# Patient Record
Sex: FEMALE | Race: WHITE | NOT HISPANIC OR LATINO | Employment: FULL TIME | URBAN - METROPOLITAN AREA
[De-identification: names, ages, dates, MRNs, and addresses within clinical notes are randomized per-mention and may not be internally consistent; named-entity substitution may affect disease eponyms.]

---

## 2017-01-19 ENCOUNTER — HOSPITAL ENCOUNTER (OUTPATIENT)
Dept: CARDIOLOGY | Facility: MEDICAL CENTER | Age: 57
End: 2017-01-19
Attending: INTERNAL MEDICINE
Payer: COMMERCIAL

## 2017-01-19 ENCOUNTER — TELEPHONE (OUTPATIENT)
Dept: CARDIOLOGY | Facility: MEDICAL CENTER | Age: 57
End: 2017-01-19

## 2017-01-19 DIAGNOSIS — R07.9 CHEST PAIN, UNSPECIFIED TYPE: ICD-10-CM

## 2017-01-19 LAB
LV EJECT FRACT  99904: 65
LV EJECT FRACT MOD 2C 99903: 64.28
LV EJECT FRACT MOD 4C 99902: 66.86
LV EJECT FRACT MOD BP 99901: 67.36

## 2017-01-19 PROCEDURE — 93306 TTE W/DOPPLER COMPLETE: CPT

## 2017-01-19 PROCEDURE — 93306 TTE W/DOPPLER COMPLETE: CPT | Performed by: INTERNAL MEDICINE

## 2017-01-19 NOTE — TELEPHONE ENCOUNTER
----- Message from Melinda Duarte M.D. sent at 1/19/2017 12:41 PM PST -----  Normal and reassuring echo

## 2017-01-19 NOTE — TELEPHONE ENCOUNTER
Called patient and advised her of normal echocardiogram results. Patient is thankful for the call.    JD CASTELLANOS

## 2017-01-26 ENCOUNTER — TELEPHONE (OUTPATIENT)
Dept: CARDIOLOGY | Facility: MEDICAL CENTER | Age: 57
End: 2017-01-26

## 2017-01-26 ENCOUNTER — HOSPITAL ENCOUNTER (OUTPATIENT)
Dept: RADIOLOGY | Facility: MEDICAL CENTER | Age: 57
End: 2017-01-26
Attending: INTERNAL MEDICINE
Payer: COMMERCIAL

## 2017-01-26 DIAGNOSIS — R07.89 OTHER CHEST PAIN: ICD-10-CM

## 2017-01-26 DIAGNOSIS — R07.2 PRECORDIAL PAIN: ICD-10-CM

## 2017-01-26 PROCEDURE — 4410556 CT-CARDIAC SCORING

## 2017-01-26 NOTE — TELEPHONE ENCOUNTER
Called patient and advised her of CT-Cardiac Scoring result and Dr. Duarte's note. Patient is thankful for the good news.    JD CASTELLANOS

## 2017-04-10 ENCOUNTER — OFFICE VISIT (OUTPATIENT)
Dept: CARDIOLOGY | Facility: MEDICAL CENTER | Age: 57
End: 2017-04-10
Payer: COMMERCIAL

## 2017-04-10 ENCOUNTER — TELEPHONE (OUTPATIENT)
Dept: CARDIOLOGY | Facility: MEDICAL CENTER | Age: 57
End: 2017-04-10

## 2017-04-10 VITALS
DIASTOLIC BLOOD PRESSURE: 84 MMHG | OXYGEN SATURATION: 95 % | HEART RATE: 78 BPM | HEIGHT: 67 IN | BODY MASS INDEX: 24.33 KG/M2 | WEIGHT: 155 LBS | SYSTOLIC BLOOD PRESSURE: 136 MMHG

## 2017-04-10 DIAGNOSIS — I20.1 VASOSPASTIC ANGINA (HCC): ICD-10-CM

## 2017-04-10 DIAGNOSIS — R07.2 PRECORDIAL PAIN: ICD-10-CM

## 2017-04-10 PROCEDURE — 99214 OFFICE O/P EST MOD 30 MIN: CPT | Performed by: INTERNAL MEDICINE

## 2017-04-10 ASSESSMENT — ENCOUNTER SYMPTOMS
SHORTNESS OF BREATH: 0
LOSS OF CONSCIOUSNESS: 0
EYES NEGATIVE: 1
ABDOMINAL PAIN: 0
WEIGHT LOSS: 0
HEARTBURN: 0
PALPITATIONS: 0
DIZZINESS: 0
MUSCULOSKELETAL NEGATIVE: 1
PND: 0
INSOMNIA: 0
COUGH: 0
HEADACHES: 0
NAUSEA: 0
NERVOUS/ANXIOUS: 0

## 2017-04-10 NOTE — Clinical Note
[facility]   [address]  [place] [address]  Phone: [phone]  Fax: [phone]              Alejandra Berkowitz  1960    Encounter Date: 4/10/2017    Melinda Duarte M.D.          PROGRESS NOTE:  Subjective:   Alejandra Berkowitz is a 57 y.o. female who presents today in follow-up in regards to her chest pain and concerns about blood pressure       Extraordinarily healthy and active, still runs for more than 10 miles very frequently. No symptoms elicited that this, but has had more stress which does cause shoulder and neck pain. Noticing her blood pressures been high-normal in the morning but also in situations  like at the dentist  She doesn't feel untoward or nervous at these times, but blood pressures as high as 150 systolic    Trying mind fullness and meditation, enjoying her life    Past Medical History   Diagnosis Date   • ASTHMA    • H/O Prinzmetal angina 2011     last time 2011   • Dental disorder      braces     Past Surgical History   Procedure Laterality Date   • Other       varicose vein   • Other orthopedic surgery  2004     neuroma bilat feet.   • Phlebectomy  6/26/2013     Performed by Darrius Blair M.D. at SURGERY [facility]   • Breast biopsy       hx of benign right breast biopsy     Family History   Problem Relation Age of Onset   • Heart Attack Neg Hx    • Heart Disease Neg Hx    • Heart Failure Neg Hx      History   Smoking status   • Former Smoker   • Quit date: 01/01/1993   Smokeless tobacco   • Never Used     No Known Allergies  Outpatient Encounter Prescriptions as of 4/10/2017   Medication Sig Dispense Refill   • nitroglycerin (NITROSTAT) 0.4 MG SL Tab Place 1 Tab under tongue as needed for Chest Pain (1 tab under the tongue every 5 minutes for chest pain, up to 3 doses in 15 minutes). 30 Tab 11   • ALBUTEROL INH Inhale 2 Puffs by mouth as needed.     • Cholecalciferol (VITAMIN D PO) Take 1 Cap by mouth every  "day.     • Cyanocobalamin (VITAMIN B 12 PO) Take 1 Tab by mouth every day.     • Docosahexaenoic Acid (DHA OMEGA 3 PO) Take 1 Cap by mouth every day.     • promethazine-codeine (PHENERGAN-CODEINE) 6.25-10 MG/5ML Syrup Take 5-10 mL by mouth 3 times a day as needed for Cough (or use qhs). (Patient not taking: Reported on 4/10/2017) 150 mL 0   • azithromycin (ZITHROMAX) 250 MG Tab z-toni; U.D. (Patient not taking: Reported on 4/10/2017) 6 Tab 1   • hydrocodone-acetaminophen (NORCO) 5-325 MG TABS per tablet Take 1-2 Tabs by mouth every four hours as needed. (Patient not taking: Reported on 4/10/2017) 30 Tab 0   • estradiol-norethindrone (COMBIPATCH) 0.05-0.14 MG/DAY patch Apply 1 Patch to skin as directed. Use one patch two times per week.       No facility-administered encounter medications on file as of 4/10/2017.     Review of Systems   Constitutional: Negative for weight loss and malaise/fatigue.   HENT: Positive for congestion. Negative for hearing loss.         Getting over a cold   Eyes: Negative.    Respiratory: Negative for cough and shortness of breath.    Cardiovascular: Negative for palpitations, leg swelling and PND.   Gastrointestinal: Negative for heartburn, nausea and abdominal pain.   Genitourinary: Negative.    Musculoskeletal: Negative.    Skin: Negative for itching.   Neurological: Negative for dizziness, loss of consciousness and headaches.   Psychiatric/Behavioral: The patient is not nervous/anxious and does not have insomnia.    All other systems reviewed and are negative.       Objective:   /84 mmHg  Pulse 78  Ht 1.702 m (5' 7.01\")  Wt 70.308 kg (155 lb)  BMI 24.27 kg/m2  SpO2 95%    Physical Exam   Constitutional: She appears well-nourished.   Neck: No JVD present.   Cardiovascular: Normal rate and regular rhythm.  Exam reveals no friction rub.    No murmur heard.  Pulmonary/Chest: Breath sounds normal.   Musculoskeletal: She exhibits no edema.   Neurological: She is alert.   Skin: " Skin is warm and dry.   Psychiatric: She has a normal mood and affect. Her behavior is normal.       Assessment:     1. Precordial pain     2. Vasospastic angina (CMS-HCC)         Medical Decision Making:  Today's Assessment / Status / Plan:     Elevated blood pressure, I requested her recent blood work. She is checking to see if her kidneys and CBC are okay she has been a vegan for many years. B12 was also checked    Talked about goals of blood pressure, she will monitored consistently and she has a monitor now at home diet and exercise    Prior heart attack, we talked more than 10 minutes about this. She is very interested in physiology as she is a . I'm not sure I can elucidate the signs behind this will be noted oftentimes heart attacks happen in cases without coronary disease. Reviewed normal images of echo as well as CT scan    Reassurance, current medications looked at, she'll continue on the nitroglycerin as needed    RTC 6 months or as needed. Talked about an angiogram if she is symptomatic with escalating concerns and she voices understanding but will hold off for now      Anaya Estrada M.D.  [address]  [address]  [place] [address]  VIA Facsimile: [phone]

## 2017-04-10 NOTE — TELEPHONE ENCOUNTER
Lab results from 4/6/2017 (CMP, CBC, TSH, Vitamin B12, Vitamin D level) reviewed by Dr. Duarte. Per Dr. Duarte, labs look great. Lipid profile can be ordered if the patient would like.  Called patient and advised her of the above. Patient verbalized understanding, she does not want her cholesterol checked at this time. She states that last time her PCP checked her cholesterol it was low and her total cholesterol was less than 120.    JD RN

## 2017-04-10 NOTE — PROGRESS NOTES
Subjective:   Alejandra Berkowitz is a 57 y.o. female who presents today in follow-up in regards to her chest pain and concerns about blood pressure       Extraordinarily healthy and active, still runs for more than 10 miles very frequently. No symptoms elicited that this, but has had more stress which does cause shoulder and neck pain. Noticing her blood pressures been high-normal in the morning but also in situations  like at the dentist  She doesn't feel untoward or nervous at these times, but blood pressures as high as 150 systolic    Trying mind fullness and meditation, enjoying her life    Past Medical History   Diagnosis Date   • ASTHMA    • H/O Prinzmetal angina 2011     last time 2011   • Dental disorder      braces     Past Surgical History   Procedure Laterality Date   • Other       varicose vein   • Other orthopedic surgery  2004     neuroma bilat feet.   • Phlebectomy  6/26/2013     Performed by Darrius Blair M.D. at SURG[facility]   • Breast biopsy       hx of benign right breast biopsy     Family History   Problem Relation Age of Onset   • Heart Attack Neg Hx    • Heart Disease Neg Hx    • Heart Failure Neg Hx      History   Smoking status   • Former Smoker   • Quit date: 01/01/1993   Smokeless tobacco   • Never Used     No Known Allergies  Outpatient Encounter Prescriptions as of 4/10/2017   Medication Sig Dispense Refill   • nitroglycerin (NITROSTAT) 0.4 MG SL Tab Place 1 Tab under tongue as needed for Chest Pain (1 tab under the tongue every 5 minutes for chest pain, up to 3 doses in 15 minutes). 30 Tab 11   • ALBUTEROL INH Inhale 2 Puffs by mouth as needed.     • Cholecalciferol (VITAMIN D PO) Take 1 Cap by mouth every day.     • Cyanocobalamin (VITAMIN B 12 PO) Take 1 Tab by mouth every day.     • Docosahexaenoic Acid (DHA OMEGA 3 PO) Take 1 Cap by mouth every day.     • promethazine-codeine (PHENERGAN-CODEINE) 6.25-10 MG/5ML Syrup Take 5-10 mL by mouth 3 times a day as needed for  "Cough (or use qhs). (Patient not taking: Reported on 4/10/2017) 150 mL 0   • azithromycin (ZITHROMAX) 250 MG Tab z-toni; U.D. (Patient not taking: Reported on 4/10/2017) 6 Tab 1   • hydrocodone-acetaminophen (NORCO) 5-325 MG TABS per tablet Take 1-2 Tabs by mouth every four hours as needed. (Patient not taking: Reported on 4/10/2017) 30 Tab 0   • estradiol-norethindrone (COMBIPATCH) 0.05-0.14 MG/DAY patch Apply 1 Patch to skin as directed. Use one patch two times per week.       No facility-administered encounter medications on file as of 4/10/2017.     Review of Systems   Constitutional: Negative for weight loss and malaise/fatigue.   HENT: Positive for congestion. Negative for hearing loss.         Getting over a cold   Eyes: Negative.    Respiratory: Negative for cough and shortness of breath.    Cardiovascular: Negative for palpitations, leg swelling and PND.   Gastrointestinal: Negative for heartburn, nausea and abdominal pain.   Genitourinary: Negative.    Musculoskeletal: Negative.    Skin: Negative for itching.   Neurological: Negative for dizziness, loss of consciousness and headaches.   Psychiatric/Behavioral: The patient is not nervous/anxious and does not have insomnia.    All other systems reviewed and are negative.       Objective:   /84 mmHg  Pulse 78  Ht 1.702 m (5' 7.01\")  Wt 70.308 kg (155 lb)  BMI 24.27 kg/m2  SpO2 95%    Physical Exam   Constitutional: She appears well-nourished.   Neck: No JVD present.   Cardiovascular: Normal rate and regular rhythm.  Exam reveals no friction rub.    No murmur heard.  Pulmonary/Chest: Breath sounds normal.   Musculoskeletal: She exhibits no edema.   Neurological: She is alert.   Skin: Skin is warm and dry.   Psychiatric: She has a normal mood and affect. Her behavior is normal.       Assessment:     1. Precordial pain     2. Vasospastic angina (CMS-HCC)         Medical Decision Making:  Today's Assessment / Status / Plan:     Elevated blood pressure, " I requested her recent blood work. She is checking to see if her kidneys and CBC are okay she has been a vegan for many years. B12 was also checked    Talked about goals of blood pressure, she will monitored consistently and she has a monitor now at home diet and exercise    Prior heart attack, we talked more than 10 minutes about this. She is very interested in physiology as she is a . I'm not sure I can elucidate the signs behind this will be noted oftentimes heart attacks happen in cases without coronary disease. Reviewed normal images of echo as well as CT scan    Reassurance, current medications looked at, she'll continue on the nitroglycerin as needed    RTC 6 months or as needed. Talked about an angiogram if she is symptomatic with escalating concerns and she voices understanding but will hold off for now

## 2017-04-10 NOTE — MR AVS SNAPSHOT
"Jyotiricia Jazzy Berkowitz   4/10/2017 12:45 PM   Office Visit   MRN: [id number]    Department:  [facility]   Dept Phone:  [phone]    Description:  Female : 1960   Provider:  Melinda Duarte M.D.           Reason for Visit     Follow-Up           Allergies as of 4/10/2017     No Known Allergies      You were diagnosed with     Precordial pain   [786.51.ICD-9-CM]         Vital Signs     Blood Pressure Pulse Height Weight Body Mass Index Oxygen Saturation    136/84 mmHg 78 1.702 m (5' 7.01\") 70.308 kg (155 lb) 24.27 kg/m2 95%    Smoking Status                   Former Smoker           Basic Information     Date Of Birth Sex Race Ethnicity Preferred Language    1960 Female White Non- English      Problem List              ICD-10-CM Priority Class Noted - Resolved    Venous (peripheral) insufficiency I87.2   2013 - Present    Precordial pain R07.2   2016 - Present      Health Maintenance        Date Due Completion Dates    IMM DTaP/Tdap/Td Vaccine (1 - Tdap) 1981 ---    PAP SMEAR 1983 ---    COLONOSCOPY 2012 ---    MAMMOGRAM 2014, 2007            Current Immunizations     No immunizations on file.      Below and/or attached are the medications your provider expects you to take. Review all of your home medications and newly ordered medications with your provider and/or pharmacist. Follow medication instructions as directed by your provider and/or pharmacist. Please keep your medication list with you and share with your provider. Update the information when medications are discontinued, doses are changed, or new medications (including over-the-counter products) are added; and carry medication information at all times in the event of emergency situations     Allergies:  No Known Allergies          Medications  Valid as of: April 10, 2017 -  1:34 PM    Generic Name Brand Name Tablet Size Instructions for use    Albuterol   Inhale 2 Puffs by mouth " as needed.        Azithromycin (Tab) ZITHROMAX 250 MG z-toni; U.D.        Cholecalciferol   Take 1 Cap by mouth every day.        Cyanocobalamin   Take 1 Tab by mouth every day.        Docosahexaenoic Acid   Take 1 Cap by mouth every day.        Estradiol-Norethindrone Acet (PATCH BIWEEKLY) COMBIPATCH 0.05-0.14 MG/DAY Apply 1 Patch to skin as directed. Use one patch two times per week.        Hydrocodone-Acetaminophen (Tab) NORCO 5-325 MG Take 1-2 Tabs by mouth every four hours as needed.        Nitroglycerin (SL Tab) NITROSTAT 0.4 MG Place 1 Tab under tongue as needed for Chest Pain (1 tab under the tongue every 5 minutes for chest pain, up to 3 doses in 15 minutes).        Promethazine-Codeine (Syrup) PHENERGAN-CODEINE 6.25-10 MG/5ML Take 5-10 mL by mouth 3 times a day as needed for Cough (or use qhs).        .                 Medicines prescribed today were sent to:     [id number] - [place] - [address]    [facility] [place] [address]    Phone: [phone] Fax: [phone]    Open 24 Hours?: No      Medication refill instructions:       If your prescription bottle indicates you have medication refills left, it is not necessary to call your provider’s office. Please contact your pharmacy and they will refill your medication.    If your prescription bottle indicates you do not have any refills left, you may request refills at any time through one of the following ways: The online [facility] system (except Urgent Care), by calling your provider’s office, or by asking your pharmacy to contact your provider’s office with a refill request. Medication refills are processed only during regular business hours and may not be available until the next business day. Your provider may request additional information or to have a follow-up visit with you prior to refilling your medication.   *Please Note: Medication refills are assigned a new Rx number when refilled electronically. Your pharmacy  may indicate that no refills were authorized even though a new prescription for the same medication is available at the pharmacy. Please request the medicine by name with the pharmacy before contacting your provider for a refill.           [employer] Access Code: Activation code not generated  Current [employer] Status: Active

## 2017-06-02 DIAGNOSIS — R07.89 OTHER CHEST PAIN: ICD-10-CM

## 2017-06-02 RX ORDER — NITROGLYCERIN 0.4 MG/1
0.4 TABLET SUBLINGUAL PRN
Qty: 30 TAB | Refills: 11 | Status: SHIPPED | OUTPATIENT
Start: 2017-06-02 | End: 2018-08-27 | Stop reason: SDUPTHER

## 2018-08-27 DIAGNOSIS — R07.89 OTHER CHEST PAIN: ICD-10-CM

## 2018-08-27 RX ORDER — NITROGLYCERIN 0.4 MG/1
0.4 TABLET SUBLINGUAL PRN
Qty: 25 TAB | Refills: 3 | Status: SHIPPED | OUTPATIENT
Start: 2018-08-27 | End: 2019-02-28 | Stop reason: SDUPTHER

## 2019-02-28 ENCOUNTER — OFFICE VISIT (OUTPATIENT)
Dept: CARDIOLOGY | Facility: MEDICAL CENTER | Age: 59
End: 2019-02-28
Payer: COMMERCIAL

## 2019-02-28 VITALS
HEART RATE: 84 BPM | HEIGHT: 67 IN | OXYGEN SATURATION: 98 % | DIASTOLIC BLOOD PRESSURE: 80 MMHG | BODY MASS INDEX: 26.57 KG/M2 | SYSTOLIC BLOOD PRESSURE: 118 MMHG | WEIGHT: 169.31 LBS

## 2019-02-28 DIAGNOSIS — I20.1 VASOSPASTIC ANGINA (HCC): ICD-10-CM

## 2019-02-28 DIAGNOSIS — R07.89 OTHER CHEST PAIN: ICD-10-CM

## 2019-02-28 DIAGNOSIS — I87.2 VENOUS INSUFFICIENCY: ICD-10-CM

## 2019-02-28 DIAGNOSIS — I20.1 PRINZMETAL ANGINA (HCC): ICD-10-CM

## 2019-02-28 DIAGNOSIS — R00.2 PALPITATIONS: ICD-10-CM

## 2019-02-28 LAB — EKG IMPRESSION: NORMAL

## 2019-02-28 PROCEDURE — 93000 ELECTROCARDIOGRAM COMPLETE: CPT | Performed by: INTERNAL MEDICINE

## 2019-02-28 PROCEDURE — 99204 OFFICE O/P NEW MOD 45 MIN: CPT | Mod: 25 | Performed by: INTERNAL MEDICINE

## 2019-02-28 RX ORDER — AMLODIPINE BESYLATE 2.5 MG/1
2.5 TABLET ORAL DAILY
Qty: 90 TAB | Refills: 3 | Status: SHIPPED | OUTPATIENT
Start: 2019-02-28 | End: 2019-07-16 | Stop reason: SDUPTHER

## 2019-02-28 RX ORDER — NITROGLYCERIN 0.4 MG/1
0.4 TABLET SUBLINGUAL PRN
Qty: 25 TAB | Refills: 20 | Status: SHIPPED | OUTPATIENT
Start: 2019-02-28 | End: 2020-09-24 | Stop reason: SDUPTHER

## 2019-02-28 ASSESSMENT — ENCOUNTER SYMPTOMS: WHEEZING: 1

## 2019-02-28 NOTE — PATIENT INSTRUCTIONS
-Amlodipine 2.5 mg daily, if the BP is still above 140/80, increase to 5 mg daily (max dose is 10 mg).  -We have a lot of choices- call or send a MyChart for any side effects of anything we try: [phone]

## 2019-02-28 NOTE — LETTER
[facility]   [address]  [place] [address]  Phone: [phone]  Fax: [phone]              Alejandra Berkowitz  1960    Encounter Date: 2/28/2019    Sakina Lopez M.D.          PROGRESS NOTE:  Subjective:   Chief Complaint:   Chief Complaint   Patient presents with   • Hypertension       Alejandra Berkowitz is a 59 y.o. female who returns today for hypertension, intermittent chest tightness and intermittent palpitations.  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  Had a heart catheterization at that time that demonstrated tiny vessel occlusion.  Previously took calcium channel blockers.  Her EKG does show small inferior Q waves.    Her prinzmetal has been well controlled, better after menopause.  Her BP was 150/100 with her dentist. Cut out caffeine, wine, avoiding salt, stress management.    Has heart racing, lastly about 5-10 sec, occurring a few times a week, increased over the past 1-2 months.    She is a runner and exercises vigorously, is a runner and able to run more than 10 miles.  Drinks 1 glass of wine daily.  She is a .  She is Vegan.    LDL cholesterol is 65.  Blood pressure controlled today without medication.  Has as needed nitroglycerin.  Calcium score January 2017 was 0.  Prior normal echo and lower extreme Doppler study.    No family history of premature coronary artery disease.  No diabetes, no autoimmune disease, no tobacco.    DATA REVIEWED by me:  ECG 2/28/2019   Also think sinus arrhythmia, rate 64, ventricular escape, small inferior Q waves    EKG 12/8/2016  Sinus bradycardia, rate 57, normal EKG    Echo 1/19/2017  EF 65%, normal echo, no RVSP    Lower extremity venous Doppler 5/30/2013  Mild venous reflux, no DVT    Calcium score 1/26/2017:    Coronary calcification:  LMA - 0.0  LCX - 0.0  LAD -  0.0  RCA - 0.0  PDA - 0.0    Calcium score: 0    Left heart catheterization around 2005 in [facility], reportedly with very tiny occluded vessel consistent with coronary spasm.    Most recent labs:     2/18/2019 TSH 2.9, B12 605, total cholesterol 132, triglycerides 68, HDL 53, LDL 65, glucose 70, creatinine 0.86, sodium 143, potassium 4.1, LFTs normal, hemoglobin 15.4, platelets 249    Past Medical History:   Diagnosis Date   • ASTHMA    • Dental disorder     braces   • H/O Prinzmetal angina 2011    last time 2011     Past Surgical History:   Procedure Laterality Date   • PHLEBECTOMY  6/26/2013    Performed by Darrius Blair M.D. at SURGERY [facility]   • OTHER ORTHOPEDIC SURGERY  2004    neuroma bilat feet.   • BREAST BIOPSY      hx of benign right breast biopsy   • OTHER      varicose vein     Family History   Problem Relation Age of Onset   • Hypertension Mother 55        Smoker, overweight, no exercise   • No Known Problems Father    • Heart Attack Neg Hx    • Heart Disease Neg Hx    • Heart Failure Neg Hx      Social History     Social History   • Marital status: Single     Spouse name: N/A   • Number of children: N/A   • Years of education: N/A     Occupational History   • Not on file.     Social History Main Topics   • Smoking status: Former Smoker     Quit date: 1/1/1993   • Smokeless tobacco: Never Used   • Alcohol use Yes      Comment: 5 per week   • Drug use: No   • Sexual activity: Not on file     Other Topics Concern   • Not on file     Social History Narrative   • No narrative on file     No Known Allergies    Current Outpatient Prescriptions   Medication Sig Dispense Refill   • amLODIPine (NORVASC) 2.5 MG Tab Take 1 Tab by mouth every day. 90 Tab 3   • nitroglycerin (NITROSTAT) 0.4 MG SL Tab Place 1 Tab under tongue as needed for Chest Pain (1 tab under the tongue every 5 minutes for chest pain, up to 3 doses in 15 minutes). 25 Tab 20   • ALBUTEROL INH Inhale 2 Puffs by mouth as needed.     •  "Cholecalciferol (VITAMIN D PO) Take 1 Cap by mouth every day.     • Cyanocobalamin (VITAMIN B 12 PO) Take 1 Tab by mouth every day.     • Docosahexaenoic Acid (DHA OMEGA 3 PO) Take 1 Cap by mouth every day.       No current facility-administered medications for this visit.        Review of Systems   Respiratory: Positive for wheezing.    Cardiovascular: Positive for chest pain.     All others systems reviewed and negative.     Objective:     Blood pressure 118/80, pulse 84, height 1.702 m (5' 7.01\"), weight 76.8 kg (169 lb 5 oz), SpO2 98 %. Body mass index is 26.51 kg/m².    Physical Exam   General: No acute distress. Well nourished.  HEENT: EOM grossly intact, no scleral icterus, no pharyngeal erythema.   Neck:  No JVD, no bruits, trachea midline  CVS: RRR. Normal S1, S2. No M/R/G. No LE edema.  2+ radial pulses, 2+ DP pulses  Resp: CTAB. No wheezing or crackles/rhonchi. Normal respiratory effort.  Abdomen: Soft, NT, no tena hepatomegaly.  MSK/Ext: No clubbing or cyanosis.  Skin: Warm and dry, no rashes.  Neurological: CN III-XII grossly intact. No focal deficits.   Psych: A&O x 3, appropriate affect, good judgement      Assessment:     1. Other chest pain  nitroglycerin (NITROSTAT) 0.4 MG SL Tab   2. Vasospastic angina (HCC)     3. Palpitations  EKG   4. Venous insufficiency     5. Prinzmetal angina (HCC)         Medical Decision Making:  Today's Assessment / Status / Plan:       -Time to start prevention medication, will use [facility].  -Nitro refill   -She will call for any issues    Written instructions given today:  -Amlodipine 2.5 mg daily, if the BP is still above 140/80, increase to 5 mg daily (max dose is 10 mg).  -We have a lot of choices- call or send a MyChart for any side effects of anything we try: [phone]    Return in about 1 year (around 2/28/2020).    It is my pleasure to participate in the care of Ms. Berkowitz.  Please do not hesitate to contact me with questions or concerns.    Sakina Lopez MD, " [facility]  Cardiologist [facility]    Please note that this dictation was created using voice recognition software. I have made every reasonable attempt to correct obvious errors, but it is possible there are errors of grammar and possibly content that I did not discover before finalizing the note.      Anaya Estrada M.D.  [address]  [address]  [place] [address]  VIA Facsimile: [phone]

## 2019-02-28 NOTE — PROGRESS NOTES
Subjective:   Chief Complaint:   Chief Complaint   Patient presents with   • Hypertension       Alejandra Berkowitz is a 59 y.o. female who returns today for hypertension, intermittent chest tightness and intermittent palpitations.  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  Had a heart catheterization at that time that demonstrated tiny vessel occlusion.  Previously took calcium channel blockers.  Her EKG does show small inferior Q waves.    Her prinzmetal has been well controlled, better after menopause.  Her BP was 150/100 with her dentist. Cut out caffeine, wine, avoiding salt, stress management.    Has heart racing, lastly about 5-10 sec, occurring a few times a week, increased over the past 1-2 months.    She is a runner and exercises vigorously, is a runner and able to run more than 10 miles.  Drinks 1 glass of wine daily.  She is a .  She is Vegan.    LDL cholesterol is 65.  Blood pressure controlled today without medication.  Has as needed nitroglycerin.  Calcium score January 2017 was 0.  Prior normal echo and lower extreme Doppler study.    No family history of premature coronary artery disease.  No diabetes, no autoimmune disease, no tobacco.    DATA REVIEWED by me:  ECG 2/28/2019   Also think sinus arrhythmia, rate 64, ventricular escape, small inferior Q waves    EKG 12/8/2016  Sinus bradycardia, rate 57, normal EKG    Echo 1/19/2017  EF 65%, normal echo, no RVSP    Lower extremity venous Doppler 5/30/2013  Mild venous reflux, no DVT    Calcium score 1/26/2017:    Coronary calcification:  LMA - 0.0  LCX - 0.0  LAD - 0.0  RCA - 0.0  PDA - 0.0    Calcium score: 0    Left heart catheterization around 2005 in [facility], reportedly with very tiny occluded vessel consistent with coronary spasm.    Most recent labs:     2/18/2019 TSH 2.9, B12 605, total cholesterol 132, triglycerides 68,  HDL 53, LDL 65, glucose 70, creatinine 0.86, sodium 143, potassium 4.1, LFTs normal, hemoglobin 15.4, platelets 249    Past Medical History:   Diagnosis Date   • ASTHMA    • Dental disorder     braces   • H/O Prinzmetal angina 2011    last time 2011     Past Surgical History:   Procedure Laterality Date   • PHLEBECTOMY  6/26/2013    Performed by Darrius Blair M.D. at SURGERY [facility]   • OTHER ORTHOPEDIC SURGERY  2004    neuroma bilat feet.   • BREAST BIOPSY      hx of benign right breast biopsy   • OTHER      varicose vein     Family History   Problem Relation Age of Onset   • Hypertension Mother 55        Smoker, overweight, no exercise   • No Known Problems Father    • Heart Attack Neg Hx    • Heart Disease Neg Hx    • Heart Failure Neg Hx      Social History     Social History   • Marital status: Single     Spouse name: N/A   • Number of children: N/A   • Years of education: N/A     Occupational History   • Not on file.     Social History Main Topics   • Smoking status: Former Smoker     Quit date: 1/1/1993   • Smokeless tobacco: Never Used   • Alcohol use Yes      Comment: 5 per week   • Drug use: No   • Sexual activity: Not on file     Other Topics Concern   • Not on file     Social History Narrative   • No narrative on file     No Known Allergies    Current Outpatient Prescriptions   Medication Sig Dispense Refill   • amLODIPine (NORVASC) 2.5 MG Tab Take 1 Tab by mouth every day. 90 Tab 3   • nitroglycerin (NITROSTAT) 0.4 MG SL Tab Place 1 Tab under tongue as needed for Chest Pain (1 tab under the tongue every 5 minutes for chest pain, up to 3 doses in 15 minutes). 25 Tab 20   • ALBUTEROL INH Inhale 2 Puffs by mouth as needed.     • Cholecalciferol (VITAMIN D PO) Take 1 Cap by mouth every day.     • Cyanocobalamin (VITAMIN B 12 PO) Take 1 Tab by mouth every day.     • Docosahexaenoic Acid (DHA OMEGA 3 PO) Take 1 Cap by mouth every day.       No current facility-administered medications for this visit.  "       Review of Systems   Respiratory: Positive for wheezing.    Cardiovascular: Positive for chest pain.     All others systems reviewed and negative.     Objective:     Blood pressure 118/80, pulse 84, height 1.702 m (5' 7.01\"), weight 76.8 kg (169 lb 5 oz), SpO2 98 %. Body mass index is 26.51 kg/m².    Physical Exam   General: No acute distress. Well nourished.  HEENT: EOM grossly intact, no scleral icterus, no pharyngeal erythema.   Neck:  No JVD, no bruits, trachea midline  CVS: RRR. Normal S1, S2. No M/R/G. No LE edema.  2+ radial pulses, 2+ DP pulses  Resp: CTAB. No wheezing or crackles/rhonchi. Normal respiratory effort.  Abdomen: Soft, NT, no tena hepatomegaly.  MSK/Ext: No clubbing or cyanosis.  Skin: Warm and dry, no rashes.  Neurological: CN III-XII grossly intact. No focal deficits.   Psych: A&O x 3, appropriate affect, good judgement      Assessment:     1. Other chest pain  nitroglycerin (NITROSTAT) 0.4 MG SL Tab   2. Vasospastic angina (HCC)     3. Palpitations  EKG   4. Venous insufficiency     5. Prinzmetal angina (HCC)         Medical Decision Making:  Today's Assessment / Status / Plan:       -Time to start prevention medication, will use [facility].  -Nitro refill   -She will call for any issues    Written instructions given today:  -Amlodipine 2.5 mg daily, if the BP is still above 140/80, increase to 5 mg daily (max dose is 10 mg).  -We have a lot of choices- call or send a MyChart for any side effects of anything we try: [phone]    Return in about 1 year (around 2/28/2020).    It is my pleasure to participate in the care of Ms. Berkowitz.  Please do not hesitate to contact me with questions or concerns.    Sakina Lopez MD, [facility]  Cardiologist [facility]    Please note that this dictation was created using voice recognition software. I have made every reasonable attempt to correct obvious errors, but it is possible there are errors of grammar and possibly " content that I did not discover before finalizing the note.

## 2019-05-06 ENCOUNTER — TELEPHONE (OUTPATIENT)
Dept: CARDIOLOGY | Facility: MEDICAL CENTER | Age: 59
End: 2019-05-06

## 2019-05-06 NOTE — TELEPHONE ENCOUNTER
Alejandra Lopez M.D. 3 hours ago (1:03 PM)         Vianney Lopez     I have been taking 2.5 mg amlodipene daily as prescribed. My blood pressure is normal, and no ankle swelling, so all good. However, I have noticed some difficulty swallowing - always feels like there is a slight lump in my throat. No breathing problems, just this slight discomfort - is this a side effect of amlodipene?     Alejandra Berkowitz      To Dr. Lopez--please advise

## 2019-05-07 NOTE — TELEPHONE ENCOUNTER
Plse let her know that is not typical of amlodipine, however if it gets worse, stop the med and send a message.  If it continues but does not get worse we could have her take a 1 week holiday from the med or have her see PCP for another cause.  Tx,  LS

## 2019-07-16 DIAGNOSIS — I10 ESSENTIAL HYPERTENSION, BENIGN: ICD-10-CM

## 2019-07-16 DIAGNOSIS — I87.2 VENOUS (PERIPHERAL) INSUFFICIENCY: ICD-10-CM

## 2019-07-16 RX ORDER — AMLODIPINE BESYLATE 2.5 MG/1
5 TABLET ORAL DAILY
Qty: 180 TAB | Refills: 1 | Status: SHIPPED | OUTPATIENT
Start: 2019-07-16 | End: 2020-02-08 | Stop reason: SDUPTHER

## 2019-09-22 ENCOUNTER — OFFICE VISIT (OUTPATIENT)
Dept: URGENT CARE | Facility: CLINIC | Age: 59
End: 2019-09-22
Payer: COMMERCIAL

## 2019-09-22 ENCOUNTER — APPOINTMENT (OUTPATIENT)
Dept: RADIOLOGY | Facility: IMAGING CENTER | Age: 59
End: 2019-09-22
Attending: NURSE PRACTITIONER
Payer: COMMERCIAL

## 2019-09-22 VITALS
HEART RATE: 95 BPM | HEIGHT: 67 IN | DIASTOLIC BLOOD PRESSURE: 76 MMHG | OXYGEN SATURATION: 97 % | TEMPERATURE: 97.5 F | SYSTOLIC BLOOD PRESSURE: 114 MMHG | RESPIRATION RATE: 16 BRPM | WEIGHT: 164 LBS | BODY MASS INDEX: 25.74 KG/M2

## 2019-09-22 DIAGNOSIS — M79.675 TOE PAIN, LEFT: ICD-10-CM

## 2019-09-22 DIAGNOSIS — S90.122A CONTUSION OF LESSER TOE OF LEFT FOOT WITHOUT DAMAGE TO NAIL, INITIAL ENCOUNTER: ICD-10-CM

## 2019-09-22 PROCEDURE — 99202 OFFICE O/P NEW SF 15 MIN: CPT | Performed by: NURSE PRACTITIONER

## 2019-09-22 PROCEDURE — 73660 X-RAY EXAM OF TOE(S): CPT | Mod: TC,LT | Performed by: NURSE PRACTITIONER

## 2019-09-22 ASSESSMENT — ENCOUNTER SYMPTOMS
SENSORY CHANGE: 0
FEVER: 0
TINGLING: 0
CHILLS: 0

## 2019-09-22 NOTE — PROGRESS NOTES
Subjective:      Alejandra Berkowitz is a 59 y.o. female who presents with Toe Injury ((L) 2nd toe-x2 days)            HPI New. 59 year old female with left second toe injury since last night when she stubbed it. She has pain with this and some mild bruising. She denies foot pain or paresthesia of this toe. Pain is non radiating at this time. Localized to mid toe region. She has taken naproxen for this.  Patient has no known allergies.  Current Outpatient Medications on File Prior to Visit   Medication Sig Dispense Refill   • amLODIPine (NORVASC) 2.5 MG Tab Take 2 Tabs by mouth every day. 180 Tab 1   • nitroglycerin (NITROSTAT) 0.4 MG SL Tab Place 1 Tab under tongue as needed for Chest Pain (1 tab under the tongue every 5 minutes for chest pain, up to 3 doses in 15 minutes). 25 Tab 20   • ALBUTEROL INH Inhale 2 Puffs by mouth as needed.     • Cholecalciferol (VITAMIN D PO) Take 1 Cap by mouth every day.     • Cyanocobalamin (VITAMIN B 12 PO) Take 1 Tab by mouth every day.     • Docosahexaenoic Acid (DHA OMEGA 3 PO) Take 1 Cap by mouth every day.       No current facility-administered medications on file prior to visit.      Social History     Socioeconomic History   • Marital status: Single     Spouse name: Not on file   • Number of children: Not on file   • Years of education: Not on file   • Highest education level: Not on file   Occupational History   • Not on file   Social Needs   • Financial resource strain: Not on file   • Food insecurity:     Worry: Not on file     Inability: Not on file   • Transportation needs:     Medical: Not on file     Non-medical: Not on file   Tobacco Use   • Smoking status: Former Smoker     Last attempt to quit: 1993     Years since quittin.7   • Smokeless tobacco: Never Used   Substance and Sexual Activity   • Alcohol use: Yes     Comment: 5 per week   • Drug use: No   • Sexual activity: Not on file   Lifestyle   • Physical activity:     Days per week: Not on file    "Minutes per session: Not on file   • Stress: Not on file   Relationships   • Social connections:     Talks on phone: Not on file     Gets together: Not on file     Attends Anglican service: Not on file     Active member of club or organization: Not on file     Attends meetings of clubs or organizations: Not on file     Relationship status: Not on file   • Intimate partner violence:     Fear of current or ex partner: Not on file     Emotionally abused: Not on file     Physically abused: Not on file     Forced sexual activity: Not on file   Other Topics Concern   • Not on file   Social History Narrative   • Not on file     Breast Cancer-related family history is not on file.      Review of Systems   Constitutional: Negative for chills and fever.   Musculoskeletal: Positive for joint pain.   Skin: Negative for rash.   Neurological: Negative for tingling and sensory change.          Objective:     /76 (BP Location: Right arm)   Pulse 95   Temp 36.4 °C (97.5 °F) (Temporal)   Resp 16   Ht 1.702 m (5' 7\")   Wt 74.4 kg (164 lb)   SpO2 97%   BMI 25.69 kg/m²      Physical Exam   Constitutional: She appears well-developed and well-nourished. No distress.   Cardiovascular: Normal rate, regular rhythm and normal heart sounds.   No murmur heard.  Pulmonary/Chest: Effort normal and breath sounds normal.   Musculoskeletal:        Left foot: There is decreased range of motion, tenderness and bony tenderness. There is no swelling.        Feet:    Neurological: She is alert.   Skin: Skin is warm and dry. No erythema.   Nursing note and vitals reviewed.              Assessment/Plan:     1. Toe pain, left  DX-TOE(S) 2+ LEFT   2. Contusion of lesser toe of left foot without damage to nail, initial encounter       Negative x-ray.  May continue naproxen and icing.  Activity as tolerated.  Differential diagnosis, natural history, supportive care, and indications for immediate follow-up discussed at length.     "

## 2020-02-08 DIAGNOSIS — I10 ESSENTIAL HYPERTENSION, BENIGN: ICD-10-CM

## 2020-02-10 RX ORDER — AMLODIPINE BESYLATE 5 MG/1
5 TABLET ORAL DAILY
Qty: 90 TAB | Refills: 1 | Status: SHIPPED | OUTPATIENT
Start: 2020-02-10 | End: 2020-07-20

## 2020-07-19 DIAGNOSIS — I10 ESSENTIAL HYPERTENSION, BENIGN: ICD-10-CM

## 2020-07-20 RX ORDER — AMLODIPINE BESYLATE 5 MG/1
TABLET ORAL
Qty: 90 TAB | Refills: 0 | Status: SHIPPED | OUTPATIENT
Start: 2020-07-20 | End: 2020-09-25 | Stop reason: SDUPTHER

## 2020-09-24 DIAGNOSIS — R07.89 OTHER CHEST PAIN: ICD-10-CM

## 2020-09-25 ENCOUNTER — PATIENT MESSAGE (OUTPATIENT)
Dept: CARDIOLOGY | Facility: MEDICAL CENTER | Age: 60
End: 2020-09-25

## 2020-09-25 DIAGNOSIS — I10 ESSENTIAL HYPERTENSION, BENIGN: ICD-10-CM

## 2020-09-25 RX ORDER — NITROGLYCERIN 0.4 MG/1
0.4 TABLET SUBLINGUAL PRN
Qty: 25 TAB | Refills: 0 | Status: SHIPPED | OUTPATIENT
Start: 2020-09-25 | End: 2020-11-11 | Stop reason: SDUPTHER

## 2020-09-25 RX ORDER — AMLODIPINE BESYLATE 5 MG/1
5 TABLET ORAL
Qty: 90 TAB | Refills: 0 | Status: SHIPPED | OUTPATIENT
Start: 2020-09-25 | End: 2020-11-11 | Stop reason: SDUPTHER

## 2020-11-11 ENCOUNTER — OFFICE VISIT (OUTPATIENT)
Dept: CARDIOLOGY | Facility: MEDICAL CENTER | Age: 60
End: 2020-11-11
Payer: COMMERCIAL

## 2020-11-11 VITALS
DIASTOLIC BLOOD PRESSURE: 92 MMHG | HEIGHT: 67 IN | BODY MASS INDEX: 27.01 KG/M2 | RESPIRATION RATE: 16 BRPM | HEART RATE: 86 BPM | SYSTOLIC BLOOD PRESSURE: 136 MMHG | WEIGHT: 172.1 LBS | OXYGEN SATURATION: 94 %

## 2020-11-11 DIAGNOSIS — R07.89 OTHER CHEST PAIN: ICD-10-CM

## 2020-11-11 DIAGNOSIS — I10 ESSENTIAL HYPERTENSION, BENIGN: ICD-10-CM

## 2020-11-11 DIAGNOSIS — I20.1 PRINZMETAL ANGINA (HCC): ICD-10-CM

## 2020-11-11 PROBLEM — M70.60 GREATER TROCHANTERIC BURSITIS: Status: ACTIVE | Noted: 2019-04-04

## 2020-11-11 PROBLEM — M62.838 OTHER MUSCLE SPASM: Status: ACTIVE | Noted: 2019-04-04

## 2020-11-11 PROBLEM — J98.01 BRONCHOSPASM: Status: ACTIVE | Noted: 2018-12-05

## 2020-11-11 PROBLEM — R03.0 ELEVATED BLOOD PRESSURE READING WITHOUT DIAGNOSIS OF HYPERTENSION: Status: ACTIVE | Noted: 2017-03-07

## 2020-11-11 PROBLEM — G57.02 PIRIFORMIS SYNDROME, LEFT: Status: ACTIVE | Noted: 2018-03-02

## 2020-11-11 PROCEDURE — 99213 OFFICE O/P EST LOW 20 MIN: CPT | Performed by: NURSE PRACTITIONER

## 2020-11-11 RX ORDER — NITROGLYCERIN 0.4 MG/1
0.4 TABLET SUBLINGUAL PRN
Qty: 25 TAB | Refills: 6 | Status: SHIPPED | OUTPATIENT
Start: 2020-11-11 | End: 2022-02-09 | Stop reason: SDUPTHER

## 2020-11-11 RX ORDER — AMLODIPINE BESYLATE 5 MG/1
5 TABLET ORAL
Qty: 90 TAB | Refills: 3 | Status: SHIPPED | OUTPATIENT
Start: 2020-11-11 | End: 2021-03-01

## 2020-11-11 ASSESSMENT — ENCOUNTER SYMPTOMS
WHEEZING: 0
FEVER: 0
PALPITATIONS: 0
VOMITING: 0
COUGH: 0
HEMOPTYSIS: 0
ORTHOPNEA: 0
PND: 0
NAUSEA: 0
CLAUDICATION: 0
HEADACHES: 0
SHORTNESS OF BREATH: 0
CHILLS: 0
DIZZINESS: 0
SPUTUM PRODUCTION: 0

## 2020-11-11 NOTE — PROGRESS NOTES
Chief Complaint   Patient presents with   • Palpitations       Subjective:   Alejandra Berkowitz is a 60 y.o. female who presents today for hypertension, intermittent chest tightness and intermittent palpitations.  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  Had a heart catheterization at that time that demonstrated tiny vessel occlusion.  Previously took calcium channel blockers.  Her EKG does show small inferior Q waves.  Patient was last seen by  2/28/2019.    Since then, she has been working on lifestyle changes and has lost weight and has been exercising, stopped EtOH, and minimized caffeine.  Her blood pressure has reduced significantly so she is cut her amlodipine to 2.5 mg daily.  Unfortunately her L blood pressure had elevated so she went back to 5 mg daily.  She states that her blood pressure is typically 117/75.  She has taken sublingual nitroglycerin one time in the past year.  Ultimately she has been doing great and has no complaints.     Her prinzmetal has been well controlled, better after menopause.  Her BP was 150/100 with her dentist. Cut out caffeine, wine, avoiding salt, stress management.     Has heart racing, lastly about 5-10 sec, occurring a few times a week, increased over the past 1-2 months.     She is a runner and exercises vigorously, is a runner and able to run more than 10 miles.  Drinks 1 glass of wine daily.  She is a .  She is Vegan.     LDL cholesterol is 65.  Blood pressure controlled today without medication.  Has as needed nitroglycerin.  Calcium score January 2017 was 0.  Prior normal echo and lower extreme Doppler study.     No family history of premature coronary artery disease.  No diabetes, no autoimmune disease, no tobacco.    Past Medical History:   Diagnosis Date   • ASTHMA    • Dental disorder     braces   • H/O Prinzmetal angina 2011    last  time      Past Surgical History:   Procedure Laterality Date   • PHLEBECTOMY  2013    Performed by Darrius Blair M.D. at SURGERY [facility]   • OTHER ORTHOPEDIC SURGERY  2004    neuroma bilat feet.   • BREAST BIOPSY      hx of benign right breast biopsy   • OTHER      varicose vein     Family History   Problem Relation Age of Onset   • Hypertension Mother 55        Smoker, overweight, no exercise   • No Known Problems Father    • Heart Attack Neg Hx    • Heart Disease Neg Hx    • Heart Failure Neg Hx      Social History     Socioeconomic History   • Marital status: Single     Spouse name: Not on file   • Number of children: Not on file   • Years of education: Not on file   • Highest education level: Not on file   Occupational History   • Not on file   Social Needs   • Financial resource strain: Not on file   • Food insecurity     Worry: Not on file     Inability: Not on file   • Transportation needs     Medical: Not on file     Non-medical: Not on file   Tobacco Use   • Smoking status: Former Smoker     Quit date: 1993     Years since quittin.8   • Smokeless tobacco: Never Used   Substance and Sexual Activity   • Alcohol use: Yes     Comment: 5 per week   • Drug use: No   • Sexual activity: Not on file   Lifestyle   • Physical activity     Days per week: Not on file     Minutes per session: Not on file   • Stress: Not on file   Relationships   • Social connections     Talks on phone: Not on file     Gets together: Not on file     Attends Jain service: Not on file     Active member of club or organization: Not on file     Attends meetings of clubs or organizations: Not on file     Relationship status: Not on file   • Intimate partner violence     Fear of current or ex partner: Not on file     Emotionally abused: Not on file     Physically abused: Not on file     Forced sexual activity: Not on file   Other Topics Concern   • Not on file   Social History Narrative   • Not on file     No  "Known Allergies  Outpatient Encounter Medications as of 11/11/2020   Medication Sig Dispense Refill   • amLODIPine (NORVASC) 5 MG Tab Take 1 Tab by mouth every day. 90 Tab 3   • nitroglycerin (NITROSTAT) 0.4 MG SL Tab Place 1 Tab under tongue as needed for Chest Pain (1 tab under the tongue every 5 minutes for chest pain, up to 3 doses in 15 minutes). 25 Tab 6   • ALBUTEROL INH Inhale 2 Puffs by mouth as needed.     • Cholecalciferol (VITAMIN D PO) Take 1 Cap by mouth every day.     • Cyanocobalamin (VITAMIN B 12 PO) Take 1 Tab by mouth every day.     • Docosahexaenoic Acid (DHA OMEGA 3 PO) Take 1 Cap by mouth every day.     • [DISCONTINUED] nitroglycerin (NITROSTAT) 0.4 MG SL Tab Place 1 Tab under tongue as needed for Chest Pain (1 tab under the tongue every 5 minutes for chest pain, up to 3 doses in 15 minutes). 25 Tab 0   • [DISCONTINUED] amLODIPine (NORVASC) 5 MG Tab Take 1 Tab by mouth every day. 90 Tab 0     No facility-administered encounter medications on file as of 11/11/2020.      Review of Systems   Constitutional: Negative for chills and fever.   Respiratory: Negative for cough, hemoptysis, sputum production, shortness of breath and wheezing.    Cardiovascular: Negative for chest pain, palpitations, orthopnea, claudication, leg swelling and PND.   Gastrointestinal: Negative for nausea and vomiting.   Neurological: Negative for dizziness and headaches.   All other systems reviewed and are negative.       Objective:   /92 (BP Location: Left arm, Patient Position: Sitting, BP Cuff Size: Adult)   Pulse 86   Resp 16   Ht 1.702 m (5' 7\")   Wt 78.1 kg (172 lb 1.6 oz)   SpO2 94%   BMI 26.95 kg/m²     Physical Exam   Constitutional: She appears well-developed and well-nourished.   Eyes: EOM are normal.   Neck: Neck supple. No JVD present.   Cardiovascular: Normal rate, regular rhythm and normal heart sounds.   No murmur heard.  Pulmonary/Chest: Effort normal and breath sounds normal.   Abdominal: " Soft.   Neurological:   Cranial nerves II-XII WNL   Skin: Skin is warm and dry.   Psychiatric: She has a normal mood and affect. Her behavior is normal. Judgment and thought content normal.   Nursing note and vitals reviewed.    ECG 2/28/2019   Also think sinus arrhythmia, rate 64, ventricular escape, small inferior Q waves     EKG 12/8/2016  Sinus bradycardia, rate 57, normal EKG     Echo 1/19/2017  EF 65%, normal echo, no RVSP     Lower extremity venous Doppler 5/30/2013  Mild venous reflux, no DVT     Calcium score 1/26/2017:     Coronary calcification:  LMA - 0.0  LCX - 0.0  LAD - 0.0  RCA - 0.0  PDA - 0.0    Calcium score: 0     Left heart catheterization around 2005 in [facility], reportedly with very tiny occluded vessel consistent with coronary spasm.    Assessment:     1. Vasospastic angina (HCC)     2. Essential hypertension, benign  amLODIPine (NORVASC) 5 MG Tab   3. Other chest pain  nitroglycerin (NITROSTAT) 0.4 MG SL Tab       Medical Decision Making:  Today's Assessment / Status / Plan:   Continue amlodipine 5 mg daily.      Follow-up visit in 1 year with Dr. Lopez.    Please note that this dictation was created using voice recognition software.  I have made every reasonable attempt to correct obvious errors, but it is possible there are errors of grammar or possibly content that I did not discover before finalizing the note.

## 2021-01-11 ENCOUNTER — HOSPITAL ENCOUNTER (OUTPATIENT)
Dept: RADIOLOGY | Facility: MEDICAL CENTER | Age: 61
End: 2021-01-11
Attending: FAMILY MEDICINE
Payer: COMMERCIAL

## 2021-01-11 DIAGNOSIS — Z12.31 VISIT FOR SCREENING MAMMOGRAM: ICD-10-CM

## 2021-01-11 PROCEDURE — 77063 BREAST TOMOSYNTHESIS BI: CPT

## 2021-03-01 DIAGNOSIS — I10 ESSENTIAL HYPERTENSION, BENIGN: ICD-10-CM

## 2021-03-01 RX ORDER — AMLODIPINE BESYLATE 2.5 MG/1
2.5 TABLET ORAL
Qty: 90 TABLET | Refills: 3 | Status: SHIPPED
Start: 2021-03-01 | End: 2021-12-21

## 2021-03-15 DIAGNOSIS — Z23 NEED FOR VACCINATION: ICD-10-CM

## 2021-12-20 ENCOUNTER — PATIENT MESSAGE (OUTPATIENT)
Dept: CARDIOLOGY | Facility: MEDICAL CENTER | Age: 61
End: 2021-12-20

## 2021-12-21 DIAGNOSIS — I10 ESSENTIAL HYPERTENSION, BENIGN: ICD-10-CM

## 2021-12-21 RX ORDER — AMLODIPINE BESYLATE 5 MG/1
5 TABLET ORAL
Qty: 90 TABLET | Refills: 0 | Status: SHIPPED | OUTPATIENT
Start: 2021-12-21 | End: 2022-02-09 | Stop reason: SDUPTHER

## 2021-12-21 NOTE — PATIENT COMMUNICATION
LUCAS Fairchild.  You Just now (3:34 PM)     Yes. Thanks    Message text      You  TC Fairchild 5 hours ago (9:54 AM)     Hi DB, ok for pt to increase Amlodipine back up to 5 mg?     Message text

## 2022-02-09 ENCOUNTER — OFFICE VISIT (OUTPATIENT)
Dept: CARDIOLOGY | Facility: MEDICAL CENTER | Age: 62
End: 2022-02-09
Payer: COMMERCIAL

## 2022-02-09 VITALS
BODY MASS INDEX: 26.84 KG/M2 | HEIGHT: 67 IN | HEART RATE: 76 BPM | RESPIRATION RATE: 16 BRPM | SYSTOLIC BLOOD PRESSURE: 130 MMHG | DIASTOLIC BLOOD PRESSURE: 84 MMHG | WEIGHT: 171 LBS | OXYGEN SATURATION: 100 %

## 2022-02-09 DIAGNOSIS — Z79.899 HIGH RISK MEDICATION USE: ICD-10-CM

## 2022-02-09 DIAGNOSIS — R07.89 OTHER CHEST PAIN: ICD-10-CM

## 2022-02-09 DIAGNOSIS — I10 ESSENTIAL HYPERTENSION, BENIGN: ICD-10-CM

## 2022-02-09 DIAGNOSIS — I20.1 PRINZMETAL ANGINA (HCC): ICD-10-CM

## 2022-02-09 PROCEDURE — 99213 OFFICE O/P EST LOW 20 MIN: CPT | Performed by: NURSE PRACTITIONER

## 2022-02-09 RX ORDER — PREDNISONE 50 MG/1
TABLET ORAL
COMMUNITY
Start: 2021-05-19 | End: 2022-02-09

## 2022-02-09 RX ORDER — NITROGLYCERIN 0.4 MG/1
0.4 TABLET SUBLINGUAL PRN
Qty: 25 TABLET | Refills: 6 | Status: SHIPPED | OUTPATIENT
Start: 2022-02-09

## 2022-02-09 RX ORDER — AMLODIPINE BESYLATE 5 MG/1
5 TABLET ORAL
Qty: 90 TABLET | Refills: 3 | Status: SHIPPED | OUTPATIENT
Start: 2022-02-09 | End: 2023-05-19 | Stop reason: SDUPTHER

## 2022-02-09 RX ORDER — ALBUTEROL SULFATE 90 UG/1
AEROSOL, METERED RESPIRATORY (INHALATION)
COMMUNITY
Start: 2021-12-17

## 2022-02-09 ASSESSMENT — ENCOUNTER SYMPTOMS
CHILLS: 0
SPUTUM PRODUCTION: 0
CLAUDICATION: 0
FEVER: 0
COUGH: 0
VOMITING: 0
NAUSEA: 0
SHORTNESS OF BREATH: 0
PND: 0
WHEEZING: 0
DIZZINESS: 0
ORTHOPNEA: 0
HEMOPTYSIS: 0
PALPITATIONS: 1
HEADACHES: 0

## 2022-02-09 NOTE — PROGRESS NOTES
Chief Complaint   Patient presents with   • Chest Pain     F/V Dx: Other chest pain   • Follow-Up     F/V Dx: Vasospastic angina (HCC)       Subjective     Martha Jazzy Berkowitz is a 62 y.o. female who presents today for hypertension, intermittent chest tightness and intermittent palpitations.  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  Had a heart catheterization at that time that demonstrated tiny vessel occlusion.  Previously took calcium channel blockers.  Her EKG does show small inferior Q waves.  Patient was last seen by  2/28/2019 and myself 11/11/2020.    Since 11/11/2020, she has been doing well.  She has had some palpitations associated with a heart rate of 75-80 as she is normally under 60.  Blood pressure is 110/60 typically.  She denies chest pain, shortness of breath, lower extremity edema, and PND.     Since then, she has been working on lifestyle changes and has lost weight and has been exercising, stopped EtOH, and minimized caffeine.  Her blood pressure has reduced significantly so she is cut her amlodipine to 2.5 mg daily.  Unfortunately her L blood pressure had elevated so she went back to 5 mg daily.  She states that her blood pressure is typically 117/75.  She has taken sublingual nitroglycerin one time in the past year.  Ultimately she has been doing great and has no complaints.     Her prinzmetal has been well controlled, better after menopause.  Her BP was 150/100 with her dentist. Cut out caffeine, wine, avoiding salt, stress management.     Has heart racing, lastly about 5-10 sec, occurring a few times a week, increased over the past 1-2 months.     She is a runner and exercises vigorously, is a runner and able to run more than 10 miles.  Drinks 1 glass of wine daily.  She is a .  She is Vegan.     LDL cholesterol is 65.  Blood pressure controlled today without  medication.  Has as needed nitroglycerin.  Calcium score 2017 was 0.  Prior normal echo and lower extreme Doppler study.     No family history of premature coronary artery disease.  No diabetes, no autoimmune disease, no tobacco.    Past Medical History:   Diagnosis Date   • ASTHMA    • Dental disorder     braces   • H/O Prinzmetal angina     last time      Past Surgical History:   Procedure Laterality Date   • PHLEBECTOMY  2013    Performed by Darrius Blair M.D. at SURGERY [facility]   • OTHER ORTHOPEDIC SURGERY      neuroma bilat feet.   • BREAST BIOPSY      hx of benign right breast biopsy   • OTHER      varicose vein     Family History   Problem Relation Age of Onset   • Hypertension Mother 55        Smoker, overweight, no exercise   • No Known Problems Father    • Heart Attack Neg Hx    • Heart Disease Neg Hx    • Heart Failure Neg Hx      Social History     Socioeconomic History   • Marital status: Single     Spouse name: Not on file   • Number of children: Not on file   • Years of education: Not on file   • Highest education level: Not on file   Occupational History   • Not on file   Tobacco Use   • Smoking status: Former Smoker     Quit date: 1993     Years since quittin.1   • Smokeless tobacco: Never Used   Vaping Use   • Vaping Use: Never used   Substance and Sexual Activity   • Alcohol use: Yes     Comment: 5 per week   • Drug use: No   • Sexual activity: Not on file   Other Topics Concern   • Not on file   Social History Narrative   • Not on file     Social Determinants of Health     Financial Resource Strain:    • Difficulty of Paying Living Expenses: Not on file   Food Insecurity:    • Worried About Running Out of Food in the Last Year: Not on file   • Ran Out of Food in the Last Year: Not on file   Transportation Needs:    • Lack of Transportation (Medical): Not on file   • Lack of Transportation (Non-Medical): Not on file   Physical Activity:    • Days of  Exercise per Week: Not on file   • Minutes of Exercise per Session: Not on file   Stress:    • Feeling of Stress : Not on file   Social Connections:    • Frequency of Communication with Friends and Family: Not on file   • Frequency of Social Gatherings with Friends and Family: Not on file   • Attends [employer] Services: Not on file   • Active Member of Clubs or Organizations: Not on file   • Attends Club or Organization Meetings: Not on file   • Marital Status: Not on file   Intimate Partner Violence:    • Fear of Current or Ex-Partner: Not on file   • Emotionally Abused: Not on file   • Physically Abused: Not on file   • Sexually Abused: Not on file   Housing Stability:    • Unable to Pay for Housing in the Last Year: Not on file   • Number of Places Lived in the Last Year: Not on file   • Unstable Housing in the Last Year: Not on file     No Known Allergies  Outpatient Encounter Medications as of 2/9/2022   Medication Sig Dispense Refill   • predniSONE (DELTASONE) 50 MG Tab PREDNISONE 50 MG TABS     • albuterol 108 (90 Base) MCG/ACT Aero Soln inhalation aerosol      • amLODIPine (NORVASC) 5 MG Tab Take 1 Tablet by mouth every day. 90 Tablet 0   • nitroglycerin (NITROSTAT) 0.4 MG SL Tab Place 1 Tab under tongue as needed for Chest Pain (1 tab under the tongue every 5 minutes for chest pain, up to 3 doses in 15 minutes). 25 Tab 6   • ALBUTEROL INH Inhale 2 Puffs by mouth as needed.     • Cholecalciferol (VITAMIN D PO) Take 1 Cap by mouth every day.     • Cyanocobalamin (VITAMIN B 12 PO) Take 1 Tab by mouth every day.     • Docosahexaenoic Acid (DHA OMEGA 3 PO) Take 1 Cap by mouth every day.       No facility-administered encounter medications on file as of 2/9/2022.     Review of Systems   Constitutional: Negative for chills and fever.   Respiratory: Negative for cough, hemoptysis, sputum production, shortness of breath and wheezing.    Cardiovascular: Positive for palpitations. Negative for chest pain, orthopnea,  "claudication, leg swelling and PND.   Gastrointestinal: Negative for nausea and vomiting.   Neurological: Negative for dizziness and headaches.   All other systems reviewed and are negative.             Objective     BP (!) 0/0 (BP Location: Left arm, Patient Position: Sitting, BP Cuff Size: Adult)   Ht 1.702 m (5' 7\")   Wt 77.6 kg (171 lb)   BMI 26.78 kg/m²     Physical Exam  Vitals and nursing note reviewed.   Constitutional:       Appearance: She is well-developed.   Neck:      Vascular: No JVD.   Cardiovascular:      Rate and Rhythm: Normal rate and regular rhythm.      Heart sounds: Normal heart sounds. No murmur heard.      Pulmonary:      Effort: Pulmonary effort is normal.      Breath sounds: Normal breath sounds.   Abdominal:      Palpations: Abdomen is soft.   Musculoskeletal:      Cervical back: Neck supple.   Skin:     General: Skin is warm and dry.   Neurological:      Comments: Cranial nerves II-XII WNL   Psychiatric:         Behavior: Behavior normal.         Thought Content: Thought content normal.         Judgment: Judgment normal.            ECG 2/28/2019   Also think sinus arrhythmia, rate 64, ventricular escape, small inferior Q waves     EKG 12/8/2016  Sinus bradycardia, rate 57, normal EKG     Echo 1/19/2017  EF 65%, normal echo, no RVSP     Lower extremity venous Doppler 5/30/2013  Mild venous reflux, no DVT     Calcium score 1/26/2017:     Coronary calcification:  LMA - 0.0  LCX - 0.0  LAD - 0.0  RCA - 0.0  PDA - 0.0    Calcium score: 0     Left heart catheterization around 2005 in [facility], reportedly with very tiny occluded vessel consistent with coronary spasm.    Assessment & Plan     1. Essential hypertension, benign     2. Other chest pain     3. Vasospastic angina (HCC)         Medical Decision Making: Today's Assessment/Status/Plan:   Continue amlodipine 5 mg daily.  She is going to try and take this at night so that she does not feel palpitations at a heart rate of 75-80.  " Patient to follow-up in 1 year.

## 2022-03-06 ENCOUNTER — OFFICE VISIT (OUTPATIENT)
Dept: URGENT CARE | Facility: CLINIC | Age: 62
End: 2022-03-06
Payer: COMMERCIAL

## 2022-03-06 VITALS
WEIGHT: 170 LBS | HEART RATE: 73 BPM | BODY MASS INDEX: 26.68 KG/M2 | TEMPERATURE: 96.7 F | HEIGHT: 67 IN | RESPIRATION RATE: 18 BRPM | SYSTOLIC BLOOD PRESSURE: 120 MMHG | DIASTOLIC BLOOD PRESSURE: 82 MMHG | OXYGEN SATURATION: 97 %

## 2022-03-06 DIAGNOSIS — S61.012A LACERATION OF LEFT THUMB WITHOUT FOREIGN BODY WITHOUT DAMAGE TO NAIL, INITIAL ENCOUNTER: ICD-10-CM

## 2022-03-06 PROCEDURE — 12001 RPR S/N/AX/GEN/TRNK 2.5CM/<: CPT | Mod: FA | Performed by: PHYSICIAN ASSISTANT

## 2022-03-06 RX ORDER — CEPHALEXIN 500 MG/1
500 CAPSULE ORAL 3 TIMES DAILY
Qty: 15 CAPSULE | Refills: 0 | Status: SHIPPED | OUTPATIENT
Start: 2022-03-06 | End: 2022-03-11

## 2022-03-06 NOTE — PROGRESS NOTES
"Subjective:   Alejandra Berkowitz is a 62 y.o. female who presents for Finger Injury (Left thumb laceration happened at 10 pm last night /)      HPI  Patient is a 62-year-old female who presents to clinic with complaints of a laceration to her left thumb onset last night approximately 13 hours ago. She was cleaning a knife and accidentally struck her left thumb. She states area bled a lot.  She cleaned the area and used a first-aid clotting powder which rolled the bleeding.  She has pain to the area.  No numbness or tingling.      Medications:    • albuterol Aers  • amLODIPine Tabs  • DHA OMEGA 3 PO  • nitroglycerin Subl  • VITAMIN B 12 PO  • VITAMIN D PO    Allergies: Patient has no known allergies.    Problem List: Alejandra Berkowitz does not have any pertinent problems on file.    Surgical History:  Past Surgical History:   Procedure Laterality Date   • PHLEBECTOMY  6/26/2013    Performed by Darrius Blair M.D. at SURGERY [facility]   • OTHER ORTHOPEDIC SURGERY  2004    neuroma bilat feet.   • BREAST BIOPSY      hx of benign right breast biopsy   • OTHER      varicose vein       Past Social Hx: Alejandra Berkowitz  reports that she quit smoking about 29 years ago. She has never used smokeless tobacco. She reports current alcohol use. She reports that she does not use drugs.     Past Family Hx:  Alejandra Berkowitz family history includes Hypertension (age of onset: 55) in her mother; No Known Problems in her father.     Problem list, medications, and allergies reviewed by myself today in Epic.     Objective:     /82   Pulse 73   Temp 35.9 °C (96.7 °F) (Temporal)   Resp 18   Ht 1.702 m (5' 7\")   Wt 77.1 kg (170 lb)   SpO2 97%   BMI 26.63 kg/m²     Physical Exam  Vitals reviewed.   Constitutional:       General: She is not in acute distress.     Appearance: Normal appearance. She is not ill-appearing or toxic-appearing.   Eyes:      Conjunctiva/sclera: Conjunctivae normal.      Pupils: Pupils " are equal, round, and reactive to light.   Cardiovascular:      Rate and Rhythm: Normal rate.   Pulmonary:      Effort: Pulmonary effort is normal.   Musculoskeletal:        Hands:       Cervical back: Neck supple.      Comments: Left distal lateral thumb there is an angular shaped laceration causing a flap. Bleeding controlled with compression. No nail damage. Full ROM and tendons intact. Sensation intact. Capillary refill < 2 seconds.    Skin:     General: Skin is warm.   Neurological:      General: No focal deficit present.      Mental Status: She is alert and oriented to person, place, and time.   Psychiatric:         Mood and Affect: Mood normal.         Behavior: Behavior normal.       Laceration Repair Procedure    Risks including bleeding, nerve damage, infection, and poor cosmetic outcome discussed. Benefits and alternative discussed.     Indication: Laceration    Location/Description: Left thumb    Procedure: The patient was placed in the appropriate position and anesthesia around the laceration was obtained by infiltration using digital block 1% Lidocaine without epinephrine. The area was then cleansed and the clotting powder and scabbing was debrided. Draped in sterile fashion. The laceration was closed with 5-0 Ethilon using interrupted sutures. There were no additional lacerations requiring repair. The wound area was then dressed with bacitracin and nonstick compression dressing.      Total repaired wound length: 1 cm.     Other Items: Suture count: 2    The patient tolerated the procedure well.    Complications: None    Diagnosis and associated orders:     1. Laceration of left thumb without foreign body without damage to nail, initial encounter  cephALEXin (KEFLEX) 500 MG Cap        Comments/MDM:     • Laceration repair as above.  There were no additional sutures needed to accomplish repair. Tetanus is up to date.   • Start prophylactic Keflex  • Keep dressing on for the next 24 hours.  Take dressing  off and encouraged gentle soap and water at least once per day and redress.  Not to use the left thumb.  • Return in 8 days for suture removal.  Return earlier if any signs of infection such as any increased redness, swelling, drainage, or any other concerns.       I personally reviewed prior external notes and test results pertinent to today's visit.  Supportive care, natural history, and indications for immediate follow-up discussed. Patient expresses understanding and agrees to plan. Patient denies any other questions or concerns.     Please note that this dictation was created using voice recognition software. I have made a reasonable attempt to correct obvious errors, but I expect that there are errors of grammar and possibly content that I did not discover before finalizing the note.    This note was electronically signed by Esdras Harman PA-C

## 2022-03-14 ENCOUNTER — OFFICE VISIT (OUTPATIENT)
Dept: URGENT CARE | Facility: CLINIC | Age: 62
End: 2022-03-14
Payer: COMMERCIAL

## 2022-03-14 VITALS
HEART RATE: 66 BPM | OXYGEN SATURATION: 98 % | HEIGHT: 67 IN | DIASTOLIC BLOOD PRESSURE: 82 MMHG | BODY MASS INDEX: 26.68 KG/M2 | TEMPERATURE: 97.4 F | SYSTOLIC BLOOD PRESSURE: 120 MMHG | WEIGHT: 170 LBS | RESPIRATION RATE: 16 BRPM

## 2022-03-14 DIAGNOSIS — Z51.89 VISIT FOR WOUND CHECK: ICD-10-CM

## 2022-03-14 PROCEDURE — 99212 OFFICE O/P EST SF 10 MIN: CPT | Performed by: NURSE PRACTITIONER

## 2022-03-14 ASSESSMENT — ENCOUNTER SYMPTOMS
FEVER: 0
VOMITING: 0
CHILLS: 0
NAUSEA: 0

## 2022-03-14 NOTE — PROGRESS NOTES
"Subjective:     Alejandra Berkowitz is a 62 y.o. female who presents for Suture Removal (Healing okay, just a little itchy today/)      PHILLY Thomas is a pleasant 62-year-old female presents to urgent care today for a wound care follow-up after having stitches placed on her left thumb 8 days ago.  She denies any pain, drainage, fever, chills or erythema.  She states that her wound has become itchy today.     Review of Systems   Constitutional: Negative for chills and fever.   Gastrointestinal: Negative for nausea and vomiting.   Skin: Negative for rash.       PMH:   Past Medical History:   Diagnosis Date   • ASTHMA    • Dental disorder     braces   • H/O Prinzmetal angina 2011    last time 2011     ALLERGIES: No Known Allergies  SURGHX:   Past Surgical History:   Procedure Laterality Date   • PHLEBECTOMY  2013    Performed by Darrius Blair M.D. at SURGERY [facility]   • OTHER ORTHOPEDIC SURGERY      neuroma bilat feet.   • BREAST BIOPSY      hx of benign right breast biopsy   • OTHER      varicose vein     SOCHX:   Social History     Socioeconomic History   • Marital status: Single   Tobacco Use   • Smoking status: Former Smoker     Quit date: 1993     Years since quittin.2   • Smokeless tobacco: Never Used   Vaping Use   • Vaping Use: Never used   Substance and Sexual Activity   • Alcohol use: Yes     Comment: 5 per week   • Drug use: No     FH:   Family History   Problem Relation Age of Onset   • Hypertension Mother 55        Smoker, overweight, no exercise   • No Known Problems Father    • Heart Attack Neg Hx    • Heart Disease Neg Hx    • Heart Failure Neg Hx          Objective:   /82   Pulse 66   Temp 36.3 °C (97.4 °F) (Temporal)   Resp 16   Ht 1.702 m (5' 7\")   Wt 77.1 kg (170 lb)   SpO2 98%   BMI 26.63 kg/m²     Physical Exam  Vitals and nursing note reviewed.   Constitutional:       General: She is not in acute distress.     Appearance: Normal appearance. She is normal " weight. She is not ill-appearing or toxic-appearing.   HENT:      Head: Normocephalic.      Right Ear: External ear normal.      Left Ear: External ear normal.      Nose: No congestion or rhinorrhea.      Mouth/Throat:      Pharynx: No oropharyngeal exudate or posterior oropharyngeal erythema.   Eyes:      General:         Right eye: No discharge.         Left eye: No discharge.      Pupils: Pupils are equal, round, and reactive to light.   Pulmonary:      Effort: Pulmonary effort is normal.   Abdominal:      General: Abdomen is flat.   Musculoskeletal:         General: Normal range of motion.      Cervical back: Normal range of motion and neck supple.   Skin:     General: Skin is dry.      Comments: Healing laceration to left tip of thumb.  Wound is not fully approximated.  2 sutures are in place.  Negative for erythema, swelling, drainage or pain with palpation.    Neurological:      General: No focal deficit present.      Mental Status: She is alert and oriented to person, place, and time. Mental status is at baseline.   Psychiatric:         Mood and Affect: Mood normal.         Behavior: Behavior normal.         Thought Content: Thought content normal.         Judgment: Judgment normal.         Assessment/Plan:   Assessment    1. Visit for wound check       Wound is not fully approximated.  Steri-Strips were placed for reinforcement of skin adherence.  Keep wound clean and dry and return to clinic in 4 days for recheck.  AVS handout given and reviewed with patient. Pt educated on red flags and when to seek treatment back in ER or UC.

## 2023-01-09 ENCOUNTER — TELEMEDICINE (OUTPATIENT)
Dept: TELEHEALTH | Facility: TELEMEDICINE | Age: 63
End: 2023-01-09
Payer: COMMERCIAL

## 2023-01-09 VITALS — BODY MASS INDEX: 23.54 KG/M2 | HEIGHT: 67 IN | WEIGHT: 150 LBS

## 2023-01-09 DIAGNOSIS — U07.1 COVID-19: ICD-10-CM

## 2023-01-09 PROCEDURE — 99213 OFFICE O/P EST LOW 20 MIN: CPT | Mod: 95 | Performed by: FAMILY MEDICINE

## 2023-01-09 NOTE — PROGRESS NOTES
Subjective     Martha Jazzy Berkowitz is a 62 y.o. female who presents with Coronavirus Screening (Tested positive this morning. Dry, cough, Runny nose , Headache)        This virtual internet evaluation was conducted via Zoom using secure and encrypted videoconferencing technology and done as best possible given this is a virtual visit. The patient was in their home in the [facility].  The patient's identity was confirmed and verbal consent was obtained for this virtual visit.    HPI: cough x 1 day, runny nose, tired feeling. Did home c19 test and was positive and is wanting paxlovid. No fevers and feels ok otherwise and w/o cp/sob    Pregnant/Breast feeding: NA    Allergies:denies     PMH: As reported by patient in chart, otherwise none reported    PSH: As reported by patient in chart, otherwise none reported    Meds: As reported by patient in chart, otherwise none reported      O: ( Mode of consult: Video visit).  - Constitutional: Alert, oriented, no visible distress and non-toxic appearing.  - Skin: No pallor or jaundice appreciated. No rashes in visible areas.  - Eye: No obvious discharge, no eyelid swelling or color change, no obvious conjunctival injection.  - ENT: Lips pink without lesions or periorbital lesions, Phonation normal (not muffled, no stridor/trismus/drooling or tripoding).  - Neck: No obvious masses/swelling/thyromegaly or tracheal deviation observed. Moves freely without pain.  - CV: skin hue appears normal, not pale or diaphoretic.  - Respiratory: Unlabored respiratory effort and no signs of respiratory distress, no appreciable tachypnea, conversing normally without difficulty, no cough or audible wheeze or stridor.  - Psych: Alert and oriented, affect, judgement and mood appear normal.      A/P:       1. COVID-19  Nirmatrelvir&Ritonavir 300/100 20 x 150 MG & 10 x 100MG Tablet Therapy Pack          Take 1/2 dose of Amlodipine daily while taking Paxlovid. Monitor blood pressure 1-2x/day or as  needed to make sure blood pressure doesn't get too low or high         Discharge Instructions:  THERE ARE SIGNIFICANT LIMITATIONS TO A VISIT THROUGH TELEMEDICINE. Certainly if not improving in the next 48-72 hours or developing new/concerning symptoms or getting/feeling worse (such as nausea, vomiting, fever/chills, chest pain, shortness of breath) go to a walk-in clinic or ER immediately for in-person evaluation.     Patient voiced understanding and agreed to plan. Please see your PCP on an annual basis and kindly call/visit your PCP ASAP to follow up on this visit and make sure you are improving and no further additional treatment or ongoing work-up is warranted.    CARE RENDERED DURING COVID PANDEMIC UNDER CRISIS STANDARDS

## 2023-04-02 ENCOUNTER — OFFICE VISIT (OUTPATIENT)
Dept: URGENT CARE | Facility: CLINIC | Age: 63
End: 2023-04-02
Payer: COMMERCIAL

## 2023-04-02 VITALS
OXYGEN SATURATION: 97 % | HEIGHT: 67 IN | WEIGHT: 150 LBS | HEART RATE: 96 BPM | DIASTOLIC BLOOD PRESSURE: 72 MMHG | SYSTOLIC BLOOD PRESSURE: 118 MMHG | TEMPERATURE: 97.8 F | BODY MASS INDEX: 23.54 KG/M2 | RESPIRATION RATE: 15 BRPM

## 2023-04-02 DIAGNOSIS — R05.1 ACUTE COUGH: ICD-10-CM

## 2023-04-02 DIAGNOSIS — J45.21 MILD INTERMITTENT ASTHMA WITH EXACERBATION: ICD-10-CM

## 2023-04-02 DIAGNOSIS — R06.2 WHEEZING: ICD-10-CM

## 2023-04-02 DIAGNOSIS — G47.01 INSOMNIA DUE TO MEDICAL CONDITION: ICD-10-CM

## 2023-04-02 PROCEDURE — 99214 OFFICE O/P EST MOD 30 MIN: CPT | Performed by: NURSE PRACTITIONER

## 2023-04-02 RX ORDER — PREDNISONE 20 MG/1
TABLET ORAL
Qty: 10 TABLET | Refills: 0 | Status: SHIPPED
Start: 2023-04-02 | End: 2023-04-02

## 2023-04-02 RX ORDER — CODEINE PHOSPHATE AND GUAIFENESIN 10; 100 MG/5ML; MG/5ML
10 SOLUTION ORAL NIGHTLY PRN
Qty: 50 ML | Refills: 0 | Status: SHIPPED | OUTPATIENT
Start: 2023-04-02 | End: 2023-04-07

## 2023-04-02 RX ORDER — CODEINE PHOSPHATE AND GUAIFENESIN 10; 100 MG/5ML; MG/5ML
10 SOLUTION ORAL NIGHTLY PRN
Qty: 50 ML | Refills: 0 | Status: SHIPPED | OUTPATIENT
Start: 2023-04-02 | End: 2023-04-02

## 2023-04-02 RX ORDER — PREDNISONE 20 MG/1
TABLET ORAL
Qty: 10 TABLET | Refills: 0 | Status: SHIPPED | OUTPATIENT
Start: 2023-04-02 | End: 2023-05-19

## 2023-04-02 NOTE — PROGRESS NOTES
"Subjective:   Alejandra Berkowitz is a 63 y.o. female who presents for Cough (Persistent cough for over a week)       HPI  Patient presents for evaluation of a 2+ week history of frequent dry cough.  Patient states that she was ill with a common cold a few days prior to this current coughing illness.  Has known exercise-induced asthma, has taken albuterol without noticing much relief of her symptoms.  Patient states she was able to sleep last night secondary to frequency of coughing.  Has also noticed increased wheezing.  Denies shortness of breath, or colored sputum.    ROS  All other systems are negative except as documented above within HPI.    MEDS:   Current Outpatient Medications:     albuterol 108 (90 Base) MCG/ACT Aero Soln inhalation aerosol, , Disp: , Rfl:     amLODIPine (NORVASC) 5 MG Tab, Take 1 Tablet by mouth every day., Disp: 90 Tablet, Rfl: 3    nitroglycerin (NITROSTAT) 0.4 MG SL Tab, Place 1 Tablet under the tongue as needed for Chest Pain (1 tab under the tongue every 5 minutes for chest pain, up to 3 doses in 15 minutes)., Disp: 25 Tablet, Rfl: 6    Nirmatrelvir&Ritonavir 300/100 20 x 150 MG & 10 x 100MG Tablet Therapy Pack, Take 300 mg nirmatrelvir (two 150 mg tablets) with 100 mg ritonavir (one 100 mg tablet) by mouth, with all three tablets taken together twice daily for 5 days., Disp: 30 Each, Rfl: 0  ALLERGIES: No Known Allergies    Patient's PMH, SocHx, SurgHx, FamHx, Drug allergies and medications were reviewed.     Objective:   /72 (BP Location: Right arm, Patient Position: Sitting, BP Cuff Size: Adult long)   Pulse 96   Temp 36.6 °C (97.8 °F) (Temporal)   Resp 15   Ht 1.702 m (5' 7\")   Wt 68 kg (150 lb)   SpO2 97%   BMI 23.49 kg/m²     Physical Exam  Vitals and nursing note reviewed.   Constitutional:       General: She is awake.      Appearance: Normal appearance. She is well-developed and normal weight.   HENT:      Head: Normocephalic and atraumatic.      Right Ear: " Tympanic membrane, ear canal and external ear normal.      Left Ear: Tympanic membrane, ear canal and external ear normal.      Nose: Nose normal.      Mouth/Throat:      Lips: Pink.      Mouth: Mucous membranes are moist.      Pharynx: Oropharynx is clear. Uvula midline.   Eyes:      Extraocular Movements: Extraocular movements intact.      Conjunctiva/sclera: Conjunctivae normal.      Pupils: Pupils are equal, round, and reactive to light.   Neck:      Thyroid: No thyromegaly.      Trachea: Trachea normal.   Cardiovascular:      Rate and Rhythm: Normal rate and regular rhythm.      Pulses: Normal pulses.      Heart sounds: Normal heart sounds, S1 normal and S2 normal.   Pulmonary:      Effort: Pulmonary effort is normal. No respiratory distress.      Breath sounds: Wheezing (coarse, througout) present. No rhonchi or rales.      Comments: +cough  Abdominal:      General: Bowel sounds are normal.      Palpations: Abdomen is soft.   Musculoskeletal:         General: Normal range of motion.      Cervical back: Full passive range of motion without pain, normal range of motion and neck supple.   Lymphadenopathy:      Cervical: No cervical adenopathy.   Skin:     General: Skin is warm and dry.      Capillary Refill: Capillary refill takes less than 2 seconds.   Neurological:      General: No focal deficit present.      Mental Status: She is alert and oriented to person, place, and time.      Gait: Gait is intact.   Psychiatric:         Attention and Perception: Attention and perception normal.         Mood and Affect: Mood normal.         Speech: Speech normal.         Behavior: Behavior normal. Behavior is cooperative.         Thought Content: Thought content normal.         Judgment: Judgment normal.       Assessment/Plan:   Assessment    1. Mild intermittent asthma with exacerbation  - predniSONE (DELTASONE) 20 MG Tab; Take 2 tablets once daily x5 days  Dispense: 10 Tablet; Refill: 0  - guaifenesin-codeine (ROBITUSSIN  AC) Solution oral solution; Take 10 mL by mouth at bedtime as needed for Cough for up to 5 days.  Dispense: 50 mL; Refill: 0    2. Wheezing  - predniSONE (DELTASONE) 20 MG Tab; Take 2 tablets once daily x5 days  Dispense: 10 Tablet; Refill: 0    3. Insomnia due to medical condition  - guaifenesin-codeine (ROBITUSSIN AC) Solution oral solution; Take 10 mL by mouth at bedtime as needed for Cough for up to 5 days.  Dispense: 50 mL; Refill: 0    4. Acute cough      Vital signs stable at today's acute urgent care visit.  Begin medications as listed.  PDMP reviewed prior to prescribing Robitussin-AC, patient does not exhibit any signs of increased use/abuse.    Advised the patient to follow-up with the primary care provider/urgent care if symptoms persist.  Red flags discussed and indications to immediately call 911 or present to the ED. All questions were encouraged and answered to the patient's satisfaction and understanding, and they agree to the plan of care.     This is an acute problem with uncertain prognosis, medication management and instructions as well as management options were provided.  I personally reviewed prior external notes and test results pertinent to today and independently reviewed and interpreted all diagnostics, to include POC testing. Time spent evaluating this patient includes preparing for visit, counseling/education, exam, evaluation, obtaining history, and ordering lab/test/procedures.      Please note that this dictation was created using voice recognition software. I have made a reasonable attempt to correct obvious errors, but I expect that there are errors of grammar and possibly content that I did not discover before finalizing the note.

## 2023-04-11 ENCOUNTER — OFFICE VISIT (OUTPATIENT)
Dept: MEDICAL GROUP | Facility: MEDICAL CENTER | Age: 63
End: 2023-04-11
Payer: COMMERCIAL

## 2023-04-11 VITALS
SYSTOLIC BLOOD PRESSURE: 112 MMHG | WEIGHT: 172.6 LBS | DIASTOLIC BLOOD PRESSURE: 74 MMHG | TEMPERATURE: 97.3 F | OXYGEN SATURATION: 97 % | HEART RATE: 84 BPM | HEIGHT: 67 IN | BODY MASS INDEX: 27.09 KG/M2

## 2023-04-11 DIAGNOSIS — R05.2 SUBACUTE COUGH: ICD-10-CM

## 2023-04-11 DIAGNOSIS — Z12.31 ENCOUNTER FOR SCREENING MAMMOGRAM FOR MALIGNANT NEOPLASM OF BREAST: ICD-10-CM

## 2023-04-11 PROCEDURE — 99213 OFFICE O/P EST LOW 20 MIN: CPT | Performed by: FAMILY MEDICINE

## 2023-04-11 RX ORDER — CODEINE PHOSPHATE AND GUAIFENESIN 10; 100 MG/5ML; MG/5ML
5 SOLUTION ORAL EVERY 4 HOURS PRN
Qty: 420 ML | Refills: 0 | Status: SHIPPED | OUTPATIENT
Start: 2023-04-11 | End: 2023-04-25

## 2023-04-11 ASSESSMENT — PATIENT HEALTH QUESTIONNAIRE - PHQ9: CLINICAL INTERPRETATION OF PHQ2 SCORE: 0

## 2023-04-11 NOTE — PROGRESS NOTES
"Subjective:     CC:  Diagnoses of Subacute cough and Encounter for screening mammogram for malignant neoplasm of breast were pertinent to this visit.    HISTORY OF THE PRESENT ILLNESS: Patient is a 63 y.o. female. This pleasant patient is here today to establish care and discuss cough.     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    Current Outpatient Medications Ordered in Epic   Medication Sig Dispense Refill    guaifenesin-codeine (ROBITUSSIN AC) Solution oral solution Take 5 mL by mouth every four hours as needed for Cough for up to 14 days. 420 mL 0    predniSONE (DELTASONE) 20 MG Tab Take 2 tablets once daily x5 days 10 Tablet 0    albuterol 108 (90 Base) MCG/ACT Aero Soln inhalation aerosol       amLODIPine (NORVASC) 5 MG Tab Take 1 Tablet by mouth every day. 90 Tablet 3    nitroglycerin (NITROSTAT) 0.4 MG SL Tab Place 1 Tablet under the tongue as needed for Chest Pain (1 tab under the tongue every 5 minutes for chest pain, up to 3 doses in 15 minutes). 25 Tablet 6     No current Epic-ordered facility-administered medications on file.       Health Maintenance: order for mammogram    ROS:   ROS see HPI      Objective:     Exam: /74   Pulse 84   Temp 36.3 °C (97.3 °F) (Temporal)   Ht 1.702 m (5' 7\")   Wt 78.3 kg (172 lb 9.6 oz)   SpO2 97%  Body mass index is 27.03 kg/m².    Physical Exam  Vitals reviewed. "   Constitutional:       General: She is not in acute distress.     Appearance: Normal appearance.   HENT:      Head: Normocephalic and atraumatic.   Cardiovascular:      Rate and Rhythm: Normal rate and regular rhythm.      Heart sounds: Normal heart sounds.   Pulmonary:      Effort: Pulmonary effort is normal. No respiratory distress.      Breath sounds: Normal breath sounds. No wheezing.   Skin:     General: Skin is warm and dry.   Neurological:      Mental Status: She is alert. Mental status is at baseline.   Psychiatric:         Mood and Affect: Mood normal.         Behavior: Behavior normal.         Assessment & Plan:   63 y.o. female with the following -    Problem List Items Addressed This Visit    None  Visit Diagnoses       Subacute cough      Persistent cough after illness is not uncommon  Patient to continue with PRN albuterol use, no signs of asthma exacerbation today  Continue daily allergy medicine, add in daily steroid nasal spray and regular sinus rinses  Will write for cough suppressant with codeine, discussed is controlled substance, PDMP reviewed    Relevant Medications    guaifenesin-codeine (ROBITUSSIN AC) Solution oral solution    Encounter for screening mammogram for malignant neoplasm of breast        Relevant Orders    MA-SCREENING MAMMO BILAT W/TOMOSYNTHESIS W/CAD            Return if symptoms worsen or fail to improve, for Annual Visit (TBD).    Please note that this dictation was created using voice recognition software. I have made every reasonable attempt to correct obvious errors, but I expect that there are errors of grammar and possibly content that I did not discover before finalizing the note.

## 2023-04-20 ENCOUNTER — HOSPITAL ENCOUNTER (OUTPATIENT)
Dept: RADIOLOGY | Facility: MEDICAL CENTER | Age: 63
End: 2023-04-20
Attending: STUDENT IN AN ORGANIZED HEALTH CARE EDUCATION/TRAINING PROGRAM
Payer: COMMERCIAL

## 2023-04-20 ENCOUNTER — HOSPITAL ENCOUNTER (OUTPATIENT)
Dept: LAB | Facility: MEDICAL CENTER | Age: 63
End: 2023-04-20
Attending: STUDENT IN AN ORGANIZED HEALTH CARE EDUCATION/TRAINING PROGRAM
Payer: COMMERCIAL

## 2023-04-20 ENCOUNTER — OFFICE VISIT (OUTPATIENT)
Dept: MEDICAL GROUP | Facility: MEDICAL CENTER | Age: 63
End: 2023-04-20
Payer: COMMERCIAL

## 2023-04-20 VITALS
DIASTOLIC BLOOD PRESSURE: 70 MMHG | OXYGEN SATURATION: 96 % | WEIGHT: 173.2 LBS | BODY MASS INDEX: 27.18 KG/M2 | TEMPERATURE: 97 F | HEIGHT: 67 IN | HEART RATE: 84 BPM | SYSTOLIC BLOOD PRESSURE: 122 MMHG

## 2023-04-20 DIAGNOSIS — R05.2 SUBACUTE COUGH: ICD-10-CM

## 2023-04-20 DIAGNOSIS — Z11.59 ENCOUNTER FOR HEPATITIS C SCREENING TEST FOR LOW RISK PATIENT: ICD-10-CM

## 2023-04-20 DIAGNOSIS — J45.20 MILD INTERMITTENT ASTHMA WITHOUT COMPLICATION: ICD-10-CM

## 2023-04-20 LAB
BASOPHILS # BLD AUTO: 0.6 % (ref 0–1.8)
BASOPHILS # BLD: 0.04 K/UL (ref 0–0.12)
EOSINOPHIL # BLD AUTO: 0.06 K/UL (ref 0–0.51)
EOSINOPHIL NFR BLD: 0.9 % (ref 0–6.9)
ERYTHROCYTE [DISTWIDTH] IN BLOOD BY AUTOMATED COUNT: 47.7 FL (ref 35.9–50)
ERYTHROCYTE [SEDIMENTATION RATE] IN BLOOD BY WESTERGREN METHOD: 6 MM/HOUR (ref 0–25)
HCT VFR BLD AUTO: 44.1 % (ref 37–47)
HCV AB SER QL: NORMAL
HGB BLD-MCNC: 14.6 G/DL (ref 12–16)
IMM GRANULOCYTES # BLD AUTO: 0.02 K/UL (ref 0–0.11)
IMM GRANULOCYTES NFR BLD AUTO: 0.3 % (ref 0–0.9)
LYMPHOCYTES # BLD AUTO: 1.6 K/UL (ref 1–4.8)
LYMPHOCYTES NFR BLD: 24.5 % (ref 22–41)
MCH RBC QN AUTO: 33 PG (ref 27–33)
MCHC RBC AUTO-ENTMCNC: 33.1 G/DL (ref 33.6–35)
MCV RBC AUTO: 99.8 FL (ref 81.4–97.8)
MONOCYTES # BLD AUTO: 0.44 K/UL (ref 0–0.85)
MONOCYTES NFR BLD AUTO: 6.7 % (ref 0–13.4)
NEUTROPHILS # BLD AUTO: 4.36 K/UL (ref 2–7.15)
NEUTROPHILS NFR BLD: 67 % (ref 44–72)
NRBC # BLD AUTO: 0 K/UL
NRBC BLD-RTO: 0 /100 WBC
PLATELET # BLD AUTO: 279 K/UL (ref 164–446)
PMV BLD AUTO: 10 FL (ref 9–12.9)
PROCALCITONIN SERPL-MCNC: 0.05 NG/ML
RBC # BLD AUTO: 4.42 M/UL (ref 4.2–5.4)
WBC # BLD AUTO: 6.5 K/UL (ref 4.8–10.8)

## 2023-04-20 PROCEDURE — 86803 HEPATITIS C AB TEST: CPT

## 2023-04-20 PROCEDURE — 99214 OFFICE O/P EST MOD 30 MIN: CPT | Performed by: STUDENT IN AN ORGANIZED HEALTH CARE EDUCATION/TRAINING PROGRAM

## 2023-04-20 PROCEDURE — 84145 PROCALCITONIN (PCT): CPT

## 2023-04-20 PROCEDURE — 71046 X-RAY EXAM CHEST 2 VIEWS: CPT

## 2023-04-20 PROCEDURE — 85025 COMPLETE CBC W/AUTO DIFF WBC: CPT

## 2023-04-20 PROCEDURE — 85652 RBC SED RATE AUTOMATED: CPT

## 2023-04-20 PROCEDURE — 36415 COLL VENOUS BLD VENIPUNCTURE: CPT

## 2023-04-20 RX ORDER — FLUTICASONE PROPIONATE 50 MCG
1 SPRAY, SUSPENSION (ML) NASAL DAILY
COMMUNITY

## 2023-04-20 RX ORDER — FLUTICASONE PROPIONATE 44 UG/1
2 AEROSOL, METERED RESPIRATORY (INHALATION) 2 TIMES DAILY PRN
Qty: 1 EACH | Refills: 1 | Status: SHIPPED | OUTPATIENT
Start: 2023-04-20 | End: 2023-05-19 | Stop reason: SDUPTHER

## 2023-04-20 NOTE — PROGRESS NOTES
"Subjective:     CC: Persistent cough x1 month    HPI:   Alejandra presents today with complaint of cough x1 month    She reports she continues to continue to cough phlegm daily.  She was last seen by Dr. Mcqueen  prescribed cough suppressant and encouraged use of Flonase with minimal improvement.    Timeline  COLD 03/20/2023 cough, running nose   4/02/2023- urgent care prescribed prednisone  4/11/2023 - saw Dr. Mcqueen    Hx of acid reflux , asthma  Taking pepcid, using flonase, taking cetirizine      Problem   Mild Intermittent Asthma Without Complication    This is a chronic condition, exercise/ cold weather induced asthma since her 20s. She has been using albuterol HFA as needed prior to exercise with good control. During episodes of cold, she often has prolong post viral cough.   - no prior hospitalization            Health Maintenance:    ROS:      Objective:     Exam:  /70 (BP Location: Right arm, Patient Position: Sitting, BP Cuff Size: Adult)   Pulse 84   Temp 36.1 °C (97 °F) (Temporal)   Ht 1.702 m (5' 7\")   Wt 78.6 kg (173 lb 3.2 oz)   SpO2 96%   BMI 27.13 kg/m²  Body mass index is 27.13 kg/m².    Physical Exam  Constitutional:       Appearance: Normal appearance.   Cardiovascular:      Rate and Rhythm: Normal rate and regular rhythm.      Pulses: Normal pulses.   Pulmonary:      Effort: Pulmonary effort is normal.      Breath sounds: Normal breath sounds.   Musculoskeletal:      Cervical back: Normal range of motion and neck supple.   Neurological:      Mental Status: She is alert.       Labs:     Assessment & Plan:     63 y.o. female with the following -     1. Subacute cough  Acute/subacute, uncontrolled  Etiology likely postviral cough/bronchitis  The setting of asthma/history of GERD/allergy/remote history of smoking  Plan  X-ray attempt today without evidence of pneumonia  Lab to evaluate for pneumonia/infection  Etiology likely bronchitis  Recommend continue conservative management plus " acid reflux medication, Flonase, Flovent, albuterol as needed  Patient would like to consider CT scan if her cough is persistent, does not improve or unresolved 1 to 2 weeks    - DX-CHEST-2 VIEWS; Future  - fluticasone (FLOVENT HFA) 44 MCG/ACT Aerosol; Inhale 2 Puffs 2 times a day as needed (asthma symptoms).  Dispense: 1 Each; Refill: 1  - Sed Rate; Future  - PROCALCITONIN; Future  - CBC WITH DIFFERENTIAL; Future    2. Mild intermittent asthma without complication  Chronic, stable  Subacute cough/postviral cough in setting of asthma  We will start Flovent to be used twice daily for the next 7 to 14 days then change to as needed    - DX-CHEST-2 VIEWS; Future  - fluticasone (FLOVENT HFA) 44 MCG/ACT Aerosol; Inhale 2 Puffs 2 times a day as needed (asthma symptoms).  Dispense: 1 Each; Refill: 1  - Sed Rate; Future  - PROCALCITONIN; Future  - CBC WITH DIFFERENTIAL; Future    3. Encounter for hepatitis C screening test for low risk patient    - HEP C VIRUS ANTIBODY; Future        No follow-ups on file.    Please note that this dictation was created using voice recognition software. I have made every reasonable attempt to correct obvious errors, but I expect that there are errors of grammar and possibly content that I did not discover before finalizing the note.

## 2023-04-24 ENCOUNTER — APPOINTMENT (OUTPATIENT)
Dept: RADIOLOGY | Facility: MEDICAL CENTER | Age: 63
End: 2023-04-24
Attending: FAMILY MEDICINE
Payer: COMMERCIAL

## 2023-05-19 ENCOUNTER — OFFICE VISIT (OUTPATIENT)
Dept: MEDICAL GROUP | Facility: MEDICAL CENTER | Age: 63
End: 2023-05-19
Payer: COMMERCIAL

## 2023-05-19 VITALS
HEIGHT: 67 IN | HEART RATE: 64 BPM | WEIGHT: 173 LBS | SYSTOLIC BLOOD PRESSURE: 110 MMHG | OXYGEN SATURATION: 96 % | DIASTOLIC BLOOD PRESSURE: 72 MMHG | TEMPERATURE: 98.2 F | RESPIRATION RATE: 16 BRPM | BODY MASS INDEX: 27.15 KG/M2

## 2023-05-19 DIAGNOSIS — I10 ESSENTIAL HYPERTENSION, BENIGN: ICD-10-CM

## 2023-05-19 DIAGNOSIS — R05.2 SUBACUTE COUGH: ICD-10-CM

## 2023-05-19 DIAGNOSIS — J45.20 MILD INTERMITTENT ASTHMA WITHOUT COMPLICATION: ICD-10-CM

## 2023-05-19 PROCEDURE — 3078F DIAST BP <80 MM HG: CPT | Performed by: STUDENT IN AN ORGANIZED HEALTH CARE EDUCATION/TRAINING PROGRAM

## 2023-05-19 PROCEDURE — 3074F SYST BP LT 130 MM HG: CPT | Performed by: STUDENT IN AN ORGANIZED HEALTH CARE EDUCATION/TRAINING PROGRAM

## 2023-05-19 PROCEDURE — 99214 OFFICE O/P EST MOD 30 MIN: CPT | Performed by: STUDENT IN AN ORGANIZED HEALTH CARE EDUCATION/TRAINING PROGRAM

## 2023-05-19 RX ORDER — AMLODIPINE BESYLATE 5 MG/1
5 TABLET ORAL
Qty: 90 TABLET | Refills: 3 | Status: SHIPPED | OUTPATIENT
Start: 2023-05-19

## 2023-05-19 RX ORDER — BECLOMETHASONE DIPROPIONATE HFA 40 UG/1
1 AEROSOL, METERED RESPIRATORY (INHALATION) DAILY
Qty: 1 EACH | Refills: 11 | Status: SHIPPED | OUTPATIENT
Start: 2023-05-19

## 2023-05-19 ASSESSMENT — ENCOUNTER SYMPTOMS
WHEEZING: 0
FEVER: 0
DIZZINESS: 0
VOMITING: 0
HEADACHES: 0
NAUSEA: 0
PALPITATIONS: 0
WEIGHT LOSS: 0
CHILLS: 0
SHORTNESS OF BREATH: 0

## 2023-05-19 NOTE — LETTER
[employer]  Luis Fernando Mcqueen D.O.  [address]  [place] [address]  Fax: [phone]   Authorization for Release/Disclosure of   Protected Health Information   Name: CASSY BERKOWITZ : 1960 SSN: [id number]   Address: [address] [place] [id number] Phone:    [phone] (home)    I authorize the entity listed below to release/disclose the PHI below to:   [facility] [employer]/Luis Fernando Mcqueen D.O. and Darrick Roque M.D.   Provider or Entity Name:     Address   City, State, Zip   Phone:      Fax:     Reason for request: continuity of care   Information to be released:    [  ] LAST COLONOSCOPY,  including any PATH REPORT and follow-up  [  ] LAST FIT/COLOGUARD RESULT [  ] LAST DEXA  [  ] LAST MAMMOGRAM  [  ] LAST PAP  [  ] LAST LABS [  ] RETINA EXAM REPORT  [  ] IMMUNIZATION RECORDS  [  ] Release all info      [  ] Check here and initial the line next to each item to release ALL health information INCLUDING  _____ Care and treatment for drug and / or alcohol abuse  _____ HIV testing, infection status, or AIDS  _____ Genetic Testing    DATES OF SERVICE OR TIME PERIOD TO BE DISCLOSED: _____________  I understand and acknowledge that:  * This Authorization may be revoked at any time by you in writing, except if your health information has already been used or disclosed.  * Your health information that will be used or disclosed as a result of you signing this authorization could be re-disclosed by the recipient. If this occurs, your re-disclosed health information may no longer be protected by State or Federal laws.  * You may refuse to sign this Authorization. Your refusal will not affect your ability to obtain treatment.  * This Authorization becomes effective upon signing and will  on (date) __________.      If no date is indicated, this Authorization will  one (1) year from the signature date.    Name: Cassy Berkowitz  Signature: Date:   2023     PLEASE FAX REQUESTED  RECORDS BACK TO: [phone]

## 2023-05-19 NOTE — PROGRESS NOTES
"Subjective:     CC: follow asthma treatment    HPI:   Alejandra presents today with    Timeline  COLD 03/20/2023 cough, running nose   4/02/2023- urgent care prescribed prednisone  4/11/2023 - saw Dr. Mcqueen- cough suppressant and conservative management  4/20/2023 - f/u subacute cough prescribed flovent, procal/esr wnl - pepcid  TODAY- report symptoms improved. Back to running.     Refill on amlodipine  Asthma specialist  - she is monitoring       Health Maintenance:     ROS:  Review of Systems   Constitutional:  Negative for chills, fever and weight loss.   HENT:  Negative for hearing loss.    Respiratory:  Negative for shortness of breath and wheezing.    Cardiovascular:  Negative for chest pain and palpitations.   Gastrointestinal:  Negative for nausea and vomiting.   Genitourinary:  Negative for frequency and urgency.   Skin:  Negative for rash.   Neurological:  Negative for dizziness and headaches.       Objective:     Exam:  /72 (BP Location: Right arm, Patient Position: Sitting, BP Cuff Size: Adult)   Pulse 64   Temp 36.8 °C (98.2 °F) (Temporal)   Resp 16   Ht 1.702 m (5' 7\")   Wt 78.5 kg (173 lb)   SpO2 96%   BMI 27.10 kg/m²  Body mass index is 27.1 kg/m².    Physical Exam  Constitutional:       Appearance: Normal appearance.   Cardiovascular:      Rate and Rhythm: Normal rate and regular rhythm.   Pulmonary:      Effort: Pulmonary effort is normal.      Breath sounds: Normal breath sounds.   Musculoskeletal:      Cervical back: Normal range of motion and neck supple.   Neurological:      Mental Status: She is alert.           Labs:     Assessment & Plan:     63 y.o. female with the following -     1. Mild intermittent asthma without complication  Chronic, stable  Report symptoms improved with flovent hfa 40mcg BID, now on daily dose. Discussed may titrate to PRN.   Plan  Change to beclomethasone due to insurance preference  - Referral to Allergy  - beclomethasone HFA (QVAR REDIHALER) 40 MCG/ACT " inhaler; Inhale 1 Puff every day.  Dispense: 1 Each; Refill: 11    2. Essential hypertension, benign  Chronic, stable  Taking amlodipine with good control of bP  Plan  - amLODIPine (NORVASC) 5 MG Tab; Take 1 Tablet by mouth every day.  Dispense: 90 Tablet; Refill: 3  - Comp Metabolic Panel; Future  - Lipid Profile; Future  - TSH WITH REFLEX TO FT4; Future  - HEMOGLOBIN A1C; Future    3. Subacute cough  resolved  - beclomethasone HFA (QVAR REDIHALER) 40 MCG/ACT inhaler; Inhale 1 Puff every day.  Dispense: 1 Each; Refill: 11          Return in about 6 months (around 11/19/2023), or if symptoms worsen or fail to improve, for Lab review.    Please note that this dictation was created using voice recognition software. I have made every reasonable attempt to correct obvious errors, but I expect that there are errors of grammar and possibly content that I did not discover before finalizing the note.

## 2023-12-19 ENCOUNTER — OFFICE VISIT (OUTPATIENT)
Dept: MEDICAL GROUP | Facility: MEDICAL CENTER | Age: 63
End: 2023-12-19
Payer: COMMERCIAL

## 2023-12-19 VITALS
WEIGHT: 151 LBS | BODY MASS INDEX: 23.7 KG/M2 | HEIGHT: 67 IN | SYSTOLIC BLOOD PRESSURE: 110 MMHG | TEMPERATURE: 98.9 F | DIASTOLIC BLOOD PRESSURE: 88 MMHG | HEART RATE: 89 BPM | OXYGEN SATURATION: 96 %

## 2023-12-19 DIAGNOSIS — F41.9 ANXIETY: ICD-10-CM

## 2023-12-19 PROCEDURE — 99213 OFFICE O/P EST LOW 20 MIN: CPT | Performed by: FAMILY MEDICINE

## 2023-12-19 PROCEDURE — 3074F SYST BP LT 130 MM HG: CPT | Performed by: FAMILY MEDICINE

## 2023-12-19 PROCEDURE — 3079F DIAST BP 80-89 MM HG: CPT | Performed by: FAMILY MEDICINE

## 2023-12-19 RX ORDER — DIAZEPAM 5 MG/1
5 TABLET ORAL
Qty: 30 TABLET | Refills: 0 | Status: SHIPPED | OUTPATIENT
Start: 2023-12-19 | End: 2024-01-18

## 2023-12-19 RX ORDER — MELOXICAM 15 MG/1
TABLET ORAL
COMMUNITY
Start: 2023-11-15

## 2023-12-19 NOTE — LETTER
December 19, 2023    To Whom It May Concern:         This is confirmation that Alejandra Berkowitz attended her scheduled appointment with Luis Fernando Mcqueen D.O. on 12/19/23. I am also confirming that she was prescribed Valium 5 mg as a medical treatment and request she be allowed to travel with this medication.         If you have any questions please do not hesitate to call me at the phone number listed below.    Sincerely,          Luis Fernando Mcqueen D.O.  [phone]

## 2023-12-19 NOTE — PROGRESS NOTES
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 Would like medicine for short term use at bedtime. Patient reports not drinking much or often. She did try THC edible a few weeks ago to see if it helped but she got paradoxical anxiety response.  Patient will be visiting family in the [place] for the next 3 weeks.      No problems updated.    Current Outpatient Medications Ordered in Epic   Medication Sig Dispense Refill    meloxicam (MOBIC) 15 MG tablet       diazePAM (VALIUM) 5 MG Tab Take 1 Tablet by mouth at bedtime as needed for Anxiety or Sleep for up to 30 days. Indications: Feeling Anxious 30 Tablet 0    amLODIPine (NORVASC) 5 MG Tab Take 1 Tablet by mouth every day. 90 Tablet 3    beclomethasone HFA (QVAR REDIHALER) 40 MCG/ACT inhaler Inhale 1 Puff every day. 1 Each 11    fluticasone (FLONASE) 50 MCG/ACT nasal spray Administer 1 Spray into affected nostril(S) every day.      albuterol 108 (90 Base) MCG/ACT Aero Soln inhalation aerosol       nitroglycerin (NITROSTAT) 0.4 MG SL Tab Place 1 Tablet under the tongue as needed for Chest Pain (1 tab under the tongue every 5 minutes for chest pain, up to 3 doses in 15 minutes). 25 Tablet 6     No current Epic-ordered facility-administered medications on file.       Health Maintenance: acute visit    ROS:  ROS see HPI     Objective:     Exam:  /88 (BP Location: Left arm, Patient Position: Sitting)   Pulse 89   Temp 37.2 °C (98.9 °F) (Temporal)   Ht 1.702 m (5' " "7\")   Wt 68.5 kg (151 lb)   SpO2 96%   BMI 23.65 kg/m²  Body mass index is 23.65 kg/m².    Physical Exam  Vitals reviewed.   Constitutional:       General: She is not in acute distress.     Appearance: Normal appearance.   HENT:      Head: Normocephalic and atraumatic.   Cardiovascular:      Rate and Rhythm: Normal rate and regular rhythm.      Heart sounds: Normal heart sounds.   Pulmonary:      Effort: Pulmonary effort is normal. No respiratory distress.      Breath sounds: Normal breath sounds.   Skin:     General: Skin is warm and dry.   Neurological:      Mental Status: She is alert. Mental status is at baseline.      Gait: Gait normal.   Psychiatric:         Mood and Affect: Mood normal.         Behavior: Behavior normal.             Assessment & Plan:     63 y.o. female with the following -     1. Anxiety  - diazePAM (VALIUM) 5 MG Tab; Take 1 Tablet by mouth at bedtime as needed for Anxiety or Sleep for up to 30 days. Indications: Feeling Anxious  Dispense: 30 Tablet; Refill: 0  - Controlled Substance Treatment Agreement  - URINE DRUG SCREEN; Future        Return in about 6 weeks (around 1/30/2024) for Medication F/U.    Please note that this dictation was created using voice recognition software. I have made every reasonable attempt to correct obvious errors, but I expect that there are errors of grammar and possibly content that I did not discover before finalizing the note.        "

## 2023-12-29 NOTE — TELEPHONE ENCOUNTER
----- Message from Melinda Duarte M.D. sent at 1/26/2017 12:50 PM PST -----  Calcium score is 0  Unbelievably good    No changes today  F/u as planned   Duplicate.  Resent today     Its shows I sent but pharmacy has not confirmed. I will also fax report showing them we have sent 28th and 29th

## 2024-04-27 DIAGNOSIS — I10 ESSENTIAL HYPERTENSION, BENIGN: ICD-10-CM

## 2024-04-27 RX ORDER — AMLODIPINE BESYLATE 5 MG/1
5 TABLET ORAL DAILY
Qty: 90 TABLET | Refills: 1 | Status: SHIPPED | OUTPATIENT
Start: 2024-04-27

## 2024-05-31 ENCOUNTER — HOSPITAL ENCOUNTER (OUTPATIENT)
Dept: RADIOLOGY | Facility: MEDICAL CENTER | Age: 64
End: 2024-05-31
Attending: FAMILY MEDICINE
Payer: COMMERCIAL

## 2024-05-31 DIAGNOSIS — Z12.31 VISIT FOR SCREENING MAMMOGRAM: ICD-10-CM

## 2024-05-31 PROCEDURE — 77063 BREAST TOMOSYNTHESIS BI: CPT

## 2024-06-03 ENCOUNTER — PATIENT MESSAGE (OUTPATIENT)
Dept: MEDICAL GROUP | Facility: MEDICAL CENTER | Age: 64
End: 2024-06-03
Payer: COMMERCIAL

## 2024-06-03 DIAGNOSIS — R03.0 ELEVATED BLOOD PRESSURE READING WITHOUT DIAGNOSIS OF HYPERTENSION: ICD-10-CM

## 2024-06-03 DIAGNOSIS — Z11.4 ENCOUNTER FOR SCREENING FOR HIV: ICD-10-CM

## 2024-06-03 DIAGNOSIS — Z13.6 SCREENING FOR CARDIOVASCULAR CONDITION: ICD-10-CM

## 2024-06-03 DIAGNOSIS — I20.1 PRINZMETAL ANGINA (HCC): ICD-10-CM

## 2024-06-03 DIAGNOSIS — J98.01 BRONCHOSPASM: ICD-10-CM

## 2024-06-03 DIAGNOSIS — F41.9 ANXIETY: ICD-10-CM

## 2024-06-03 DIAGNOSIS — J45.20 MILD INTERMITTENT ASTHMA WITHOUT COMPLICATION: ICD-10-CM

## 2024-06-05 ENCOUNTER — APPOINTMENT (RX ONLY)
Dept: URBAN - METROPOLITAN AREA CLINIC 4 | Facility: CLINIC | Age: 64
Setting detail: DERMATOLOGY
End: 2024-06-05

## 2024-06-05 DIAGNOSIS — D18.0 HEMANGIOMA: ICD-10-CM

## 2024-06-05 DIAGNOSIS — D22 MELANOCYTIC NEVI: ICD-10-CM

## 2024-06-05 DIAGNOSIS — L81.4 OTHER MELANIN HYPERPIGMENTATION: ICD-10-CM

## 2024-06-05 DIAGNOSIS — L82.1 OTHER SEBORRHEIC KERATOSIS: ICD-10-CM

## 2024-06-05 DIAGNOSIS — Z71.89 OTHER SPECIFIED COUNSELING: ICD-10-CM

## 2024-06-05 PROBLEM — D22.5 MELANOCYTIC NEVI OF TRUNK: Status: ACTIVE | Noted: 2024-06-05

## 2024-06-05 PROBLEM — D18.01 HEMANGIOMA OF SKIN AND SUBCUTANEOUS TISSUE: Status: ACTIVE | Noted: 2024-06-05

## 2024-06-05 PROCEDURE — 99203 OFFICE O/P NEW LOW 30 MIN: CPT

## 2024-06-05 PROCEDURE — ? SUNSCREEN RECOMMENDATIONS

## 2024-06-05 PROCEDURE — ? COUNSELING

## 2024-06-05 ASSESSMENT — LOCATION DETAILED DESCRIPTION DERM
LOCATION DETAILED: LEFT SUPERIOR UPPER BACK
LOCATION DETAILED: RIGHT MID-UPPER BACK
LOCATION DETAILED: LEFT POSTERIOR SHOULDER
LOCATION DETAILED: RIGHT POSTERIOR SHOULDER
LOCATION DETAILED: SUPERIOR MID FOREHEAD
LOCATION DETAILED: INFERIOR THORACIC SPINE
LOCATION DETAILED: SUPERIOR LUMBAR SPINE
LOCATION DETAILED: EPIGASTRIC SKIN

## 2024-06-05 ASSESSMENT — LOCATION SIMPLE DESCRIPTION DERM
LOCATION SIMPLE: RIGHT UPPER BACK
LOCATION SIMPLE: LEFT UPPER BACK
LOCATION SIMPLE: RIGHT SHOULDER
LOCATION SIMPLE: ABDOMEN
LOCATION SIMPLE: LOWER BACK
LOCATION SIMPLE: UPPER BACK
LOCATION SIMPLE: LEFT SHOULDER
LOCATION SIMPLE: SUPERIOR FOREHEAD

## 2024-06-05 ASSESSMENT — LOCATION ZONE DERM
LOCATION ZONE: ARM
LOCATION ZONE: TRUNK
LOCATION ZONE: FACE

## 2024-06-14 ENCOUNTER — HOSPITAL ENCOUNTER (OUTPATIENT)
Dept: LAB | Facility: MEDICAL CENTER | Age: 64
End: 2024-06-14
Attending: FAMILY MEDICINE
Payer: COMMERCIAL

## 2024-06-14 DIAGNOSIS — Z11.4 ENCOUNTER FOR SCREENING FOR HIV: ICD-10-CM

## 2024-06-14 DIAGNOSIS — F41.9 ANXIETY: ICD-10-CM

## 2024-06-14 DIAGNOSIS — Z13.6 SCREENING FOR CARDIOVASCULAR CONDITION: ICD-10-CM

## 2024-06-14 DIAGNOSIS — J45.20 MILD INTERMITTENT ASTHMA WITHOUT COMPLICATION: ICD-10-CM

## 2024-06-14 DIAGNOSIS — R03.0 ELEVATED BLOOD PRESSURE READING WITHOUT DIAGNOSIS OF HYPERTENSION: ICD-10-CM

## 2024-06-14 DIAGNOSIS — I20.1 PRINZMETAL ANGINA (HCC): ICD-10-CM

## 2024-06-14 DIAGNOSIS — J98.01 BRONCHOSPASM: ICD-10-CM

## 2024-06-14 LAB
ALBUMIN SERPL BCP-MCNC: 4.3 G/DL (ref 3.2–4.9)
ALBUMIN/GLOB SERPL: 1.5 G/DL
ALP SERPL-CCNC: 82 U/L (ref 30–99)
ALT SERPL-CCNC: 13 U/L (ref 2–50)
ANION GAP SERPL CALC-SCNC: 13 MMOL/L (ref 7–16)
AST SERPL-CCNC: 21 U/L (ref 12–45)
BASOPHILS # BLD AUTO: 0.7 % (ref 0–1.8)
BASOPHILS # BLD: 0.04 K/UL (ref 0–0.12)
BILIRUB SERPL-MCNC: 0.8 MG/DL (ref 0.1–1.5)
BUN SERPL-MCNC: 10 MG/DL (ref 8–22)
CALCIUM ALBUM COR SERPL-MCNC: 9.1 MG/DL (ref 8.5–10.5)
CALCIUM SERPL-MCNC: 9.3 MG/DL (ref 8.5–10.5)
CHLORIDE SERPL-SCNC: 104 MMOL/L (ref 96–112)
CHOLEST SERPL-MCNC: 177 MG/DL (ref 100–199)
CO2 SERPL-SCNC: 24 MMOL/L (ref 20–33)
CREAT SERPL-MCNC: 0.69 MG/DL (ref 0.5–1.4)
CREAT UR-MCNC: 235.1 MG/DL
EOSINOPHIL # BLD AUTO: 0.07 K/UL (ref 0–0.51)
EOSINOPHIL NFR BLD: 1.3 % (ref 0–6.9)
ERYTHROCYTE [DISTWIDTH] IN BLOOD BY AUTOMATED COUNT: 47.1 FL (ref 35.9–50)
EST. AVERAGE GLUCOSE BLD GHB EST-MCNC: 100 MG/DL
GFR SERPLBLD CREATININE-BSD FMLA CKD-EPI: 98 ML/MIN/1.73 M 2
GLOBULIN SER CALC-MCNC: 2.9 G/DL (ref 1.9–3.5)
GLUCOSE SERPL-MCNC: 89 MG/DL (ref 65–99)
HBA1C MFR BLD: 5.1 % (ref 4–5.6)
HCT VFR BLD AUTO: 45.3 % (ref 37–47)
HDLC SERPL-MCNC: 71 MG/DL
HGB BLD-MCNC: 15.3 G/DL (ref 12–16)
HIV 1+2 AB+HIV1 P24 AG SERPL QL IA: NORMAL
IMM GRANULOCYTES # BLD AUTO: 0.01 K/UL (ref 0–0.11)
IMM GRANULOCYTES NFR BLD AUTO: 0.2 % (ref 0–0.9)
LDLC SERPL CALC-MCNC: 83 MG/DL
LYMPHOCYTES # BLD AUTO: 1.63 K/UL (ref 1–4.8)
LYMPHOCYTES NFR BLD: 30.2 % (ref 22–41)
MCH RBC QN AUTO: 34.4 PG (ref 27–33)
MCHC RBC AUTO-ENTMCNC: 33.8 G/DL (ref 32.2–35.5)
MCV RBC AUTO: 101.8 FL (ref 81.4–97.8)
MICROALBUMIN UR-MCNC: 2.2 MG/DL
MICROALBUMIN/CREAT UR: 9 MG/G (ref 0–30)
MONOCYTES # BLD AUTO: 0.36 K/UL (ref 0–0.85)
MONOCYTES NFR BLD AUTO: 6.7 % (ref 0–13.4)
NEUTROPHILS # BLD AUTO: 3.28 K/UL (ref 1.82–7.42)
NEUTROPHILS NFR BLD: 60.9 % (ref 44–72)
NRBC # BLD AUTO: 0 K/UL
NRBC BLD-RTO: 0 /100 WBC (ref 0–0.2)
PLATELET # BLD AUTO: 272 K/UL (ref 164–446)
PMV BLD AUTO: 10 FL (ref 9–12.9)
POTASSIUM SERPL-SCNC: 4.9 MMOL/L (ref 3.6–5.5)
PROT SERPL-MCNC: 7.2 G/DL (ref 6–8.2)
RBC # BLD AUTO: 4.45 M/UL (ref 4.2–5.4)
SODIUM SERPL-SCNC: 141 MMOL/L (ref 135–145)
TRIGL SERPL-MCNC: 117 MG/DL (ref 0–149)
TSH SERPL DL<=0.005 MIU/L-ACNC: 3 UIU/ML (ref 0.38–5.33)
WBC # BLD AUTO: 5.4 K/UL (ref 4.8–10.8)

## 2024-06-14 PROCEDURE — 36415 COLL VENOUS BLD VENIPUNCTURE: CPT

## 2024-06-14 PROCEDURE — 85025 COMPLETE CBC W/AUTO DIFF WBC: CPT

## 2024-06-14 PROCEDURE — 87389 HIV-1 AG W/HIV-1&-2 AB AG IA: CPT

## 2024-06-14 PROCEDURE — 83036 HEMOGLOBIN GLYCOSYLATED A1C: CPT

## 2024-06-14 PROCEDURE — 82570 ASSAY OF URINE CREATININE: CPT

## 2024-06-14 PROCEDURE — 84443 ASSAY THYROID STIM HORMONE: CPT

## 2024-06-14 PROCEDURE — 82043 UR ALBUMIN QUANTITATIVE: CPT

## 2024-06-14 PROCEDURE — 80061 LIPID PANEL: CPT

## 2024-06-14 PROCEDURE — 80053 COMPREHEN METABOLIC PANEL: CPT

## 2024-06-21 ENCOUNTER — OFFICE VISIT (OUTPATIENT)
Dept: MEDICAL GROUP | Facility: MEDICAL CENTER | Age: 64
End: 2024-06-21
Payer: COMMERCIAL

## 2024-06-21 VITALS
HEIGHT: 67 IN | DIASTOLIC BLOOD PRESSURE: 62 MMHG | BODY MASS INDEX: 24.48 KG/M2 | HEART RATE: 80 BPM | OXYGEN SATURATION: 98 % | WEIGHT: 156 LBS | TEMPERATURE: 97.3 F | SYSTOLIC BLOOD PRESSURE: 120 MMHG

## 2024-06-21 DIAGNOSIS — I10 PRIMARY HYPERTENSION: ICD-10-CM

## 2024-06-21 DIAGNOSIS — R41.840 ATTENTION DEFICIT: ICD-10-CM

## 2024-06-21 DIAGNOSIS — D75.89 MACROCYTOSIS WITHOUT ANEMIA: ICD-10-CM

## 2024-06-21 DIAGNOSIS — R07.2 PRECORDIAL PAIN: ICD-10-CM

## 2024-06-21 DIAGNOSIS — I20.1 PRINZMETAL ANGINA (HCC): ICD-10-CM

## 2024-06-21 DIAGNOSIS — Z12.11 COLON CANCER SCREENING: ICD-10-CM

## 2024-06-21 DIAGNOSIS — F41.0 GENERALIZED ANXIETY DISORDER WITH PANIC ATTACKS: ICD-10-CM

## 2024-06-21 DIAGNOSIS — J45.20 MILD INTERMITTENT ASTHMA WITHOUT COMPLICATION: ICD-10-CM

## 2024-06-21 DIAGNOSIS — F41.1 GENERALIZED ANXIETY DISORDER WITH PANIC ATTACKS: ICD-10-CM

## 2024-06-21 DIAGNOSIS — R05.2 SUBACUTE COUGH: ICD-10-CM

## 2024-06-21 DIAGNOSIS — Z23 NEED FOR VACCINATION: ICD-10-CM

## 2024-06-21 PROCEDURE — 99214 OFFICE O/P EST MOD 30 MIN: CPT | Mod: 25 | Performed by: FAMILY MEDICINE

## 2024-06-21 PROCEDURE — 90471 IMMUNIZATION ADMIN: CPT | Performed by: FAMILY MEDICINE

## 2024-06-21 PROCEDURE — 3078F DIAST BP <80 MM HG: CPT | Performed by: FAMILY MEDICINE

## 2024-06-21 PROCEDURE — 3074F SYST BP LT 130 MM HG: CPT | Performed by: FAMILY MEDICINE

## 2024-06-21 PROCEDURE — 90677 PCV20 VACCINE IM: CPT | Performed by: FAMILY MEDICINE

## 2024-06-21 RX ORDER — DIAZEPAM 5 MG/1
5 TABLET ORAL
Qty: 30 TABLET | Refills: 0 | Status: SHIPPED | OUTPATIENT
Start: 2024-06-21 | End: 2024-09-19

## 2024-06-21 RX ORDER — BECLOMETHASONE DIPROPIONATE HFA 40 UG/1
1 AEROSOL, METERED RESPIRATORY (INHALATION) DAILY
Qty: 1 EACH | Refills: 11 | Status: SHIPPED | OUTPATIENT
Start: 2024-06-21

## 2024-06-21 RX ORDER — AMLODIPINE BESYLATE 5 MG/1
5 TABLET ORAL DAILY
Qty: 90 TABLET | Refills: 3 | Status: SHIPPED | OUTPATIENT
Start: 2024-06-21

## 2024-06-21 ASSESSMENT — PATIENT HEALTH QUESTIONNAIRE - PHQ9: CLINICAL INTERPRETATION OF PHQ2 SCORE: 0

## 2024-06-21 ASSESSMENT — FIBROSIS 4 INDEX: FIB4 SCORE: 1.33

## 2024-07-03 ENCOUNTER — TELEPHONE (OUTPATIENT)
Dept: HEALTH INFORMATION MANAGEMENT | Facility: OTHER | Age: 64
End: 2024-07-03
Payer: COMMERCIAL

## 2024-09-01 ENCOUNTER — APPOINTMENT (OUTPATIENT)
Dept: RADIOLOGY | Facility: IMAGING CENTER | Age: 64
End: 2024-09-01
Attending: NURSE PRACTITIONER
Payer: COMMERCIAL

## 2024-09-01 ENCOUNTER — OFFICE VISIT (OUTPATIENT)
Dept: URGENT CARE | Facility: CLINIC | Age: 64
End: 2024-09-01
Payer: COMMERCIAL

## 2024-09-01 VITALS
OXYGEN SATURATION: 97 % | BODY MASS INDEX: 25.11 KG/M2 | WEIGHT: 160 LBS | DIASTOLIC BLOOD PRESSURE: 70 MMHG | HEART RATE: 75 BPM | RESPIRATION RATE: 17 BRPM | TEMPERATURE: 97.6 F | SYSTOLIC BLOOD PRESSURE: 114 MMHG | HEIGHT: 67 IN

## 2024-09-01 DIAGNOSIS — S99.922A TOE INJURY, LEFT, INITIAL ENCOUNTER: ICD-10-CM

## 2024-09-01 DIAGNOSIS — S90.129A CONTUSION OF FIFTH TOE: ICD-10-CM

## 2024-09-01 PROCEDURE — 73660 X-RAY EXAM OF TOE(S): CPT | Mod: TC,LT | Performed by: RADIOLOGY

## 2024-09-01 PROCEDURE — 3074F SYST BP LT 130 MM HG: CPT | Performed by: NURSE PRACTITIONER

## 2024-09-01 PROCEDURE — 99213 OFFICE O/P EST LOW 20 MIN: CPT | Performed by: NURSE PRACTITIONER

## 2024-09-01 PROCEDURE — 3078F DIAST BP <80 MM HG: CPT | Performed by: NURSE PRACTITIONER

## 2024-09-01 ASSESSMENT — FIBROSIS 4 INDEX: FIB4 SCORE: 1.33

## 2024-09-04 ASSESSMENT — ENCOUNTER SYMPTOMS
CHILLS: 0
TINGLING: 0
MYALGIAS: 1
FEVER: 0
SENSORY CHANGE: 0
FOCAL WEAKNESS: 0

## 2024-09-04 NOTE — PROGRESS NOTES
Subjective     Martha Jazzy Berkowitz is a 64 y.o. female who presents with Toe Injury (Stubbed little toe on her left foot, states that it still hurting after over two weeks. Worried there might be a fracture)            HPI  New problem.  Patient is a very pleasant 64-year-old female who has a toe injury to her left little toe for over 2 weeks after stepping on a piece of furniture.  She reports that his still been hurting and tender to palpation.  She denies any foot pain, distal paresthesia, or focal weakness.  She has been icing and taking ibuprofen for her symptoms.    Patient has no known allergies.  Current Outpatient Medications on File Prior to Visit   Medication Sig Dispense Refill    beclomethasone HFA (QVAR REDIHALER) 40 MCG/ACT inhaler Inhale 1 Puff every day. 1 Each 11    amLODIPine (NORVASC) 5 MG Tab Take 1 Tablet by mouth every day. 90 Tablet 3    diazePAM (VALIUM) 5 MG Tab Take 1 Tablet by mouth 1 time a day as needed for Anxiety for up to 90 days. Indications: Feeling Anxious 30 Tablet 0    meloxicam (MOBIC) 15 MG tablet       albuterol 108 (90 Base) MCG/ACT Aero Soln inhalation aerosol       nitroglycerin (NITROSTAT) 0.4 MG SL Tab Place 1 Tablet under the tongue as needed for Chest Pain (1 tab under the tongue every 5 minutes for chest pain, up to 3 doses in 15 minutes). 25 Tablet 6     No current facility-administered medications on file prior to visit.     Social History     Socioeconomic History    Marital status: Single     Spouse name: Not on file    Number of children: Not on file    Years of education: Not on file    Highest education level: Not on file   Occupational History    Not on file   Tobacco Use    Smoking status: Former     Current packs/day: 0.00     Types: Cigarettes     Quit date: 1993     Years since quittin.6    Smokeless tobacco: Never   Vaping Use    Vaping status: Never Used   Substance and Sexual Activity    Alcohol use: Yes     Comment: 5 per week    Drug use: No     "Sexual activity: Not on file   Other Topics Concern    Not on file   Social History Narrative    Not on file     Social Determinants of Health     Financial Resource Strain: Not on file   Food Insecurity: Not on file   Transportation Needs: Not on file   Physical Activity: Not on file   Stress: Not on file   Social Connections: Not on file   Intimate Partner Violence: Not on file   Housing Stability: Not on file     Breast Cancer-related family history is not on file.      Review of Systems   Constitutional:  Negative for chills, fever and malaise/fatigue.   Musculoskeletal:  Positive for joint pain and myalgias.   Neurological:  Negative for tingling, sensory change and focal weakness.   All other systems reviewed and are negative.           Objective     /70   Pulse 75   Temp 36.4 °C (97.6 °F) (Temporal)   Resp 17   Ht 1.702 m (5' 7\")   Wt 72.6 kg (160 lb)   SpO2 97%   BMI 25.06 kg/m²      Physical Exam  Constitutional:       Appearance: Normal appearance.   Cardiovascular:      Rate and Rhythm: Normal rate and regular rhythm.      Heart sounds: No murmur heard.  Pulmonary:      Effort: Pulmonary effort is normal.      Breath sounds: Normal breath sounds.   Musculoskeletal:      Left foot: Decreased range of motion. Swelling, tenderness and bony tenderness present.        Legs:    Skin:     General: Skin is warm and dry.      Findings: No bruising.   Neurological:      General: No focal deficit present.      Mental Status: She is alert and oriented to person, place, and time.                         Assessment & Plan   1. Contusion of fifth toe        2. Toe injury, left, initial encounter  DX-TOE(S) 2+ LEFT        Negative imaging.  Reassurance to patient.  May continue ibuprofen/icing.  Differential diagnosis, natural history, supportive care, and indications for immediate follow-up were discussed.        Assessment & Plan  Toe injury, left, initial encounter    Orders:    DX-TOE(S) 2+ LEFT; " Future

## 2024-12-10 DIAGNOSIS — I10 PRIMARY HYPERTENSION: ICD-10-CM

## 2024-12-12 RX ORDER — AMLODIPINE BESYLATE 5 MG/1
5 TABLET ORAL DAILY
Qty: 90 TABLET | Refills: 3 | Status: SHIPPED | OUTPATIENT
Start: 2024-12-12

## 2025-01-26 ENCOUNTER — APPOINTMENT (OUTPATIENT)
Dept: RADIOLOGY | Facility: IMAGING CENTER | Age: 65
End: 2025-01-26
Attending: NURSE PRACTITIONER
Payer: COMMERCIAL

## 2025-01-26 ENCOUNTER — OFFICE VISIT (OUTPATIENT)
Dept: URGENT CARE | Facility: CLINIC | Age: 65
End: 2025-01-26
Payer: COMMERCIAL

## 2025-01-26 VITALS
DIASTOLIC BLOOD PRESSURE: 62 MMHG | RESPIRATION RATE: 21 BRPM | TEMPERATURE: 100.5 F | SYSTOLIC BLOOD PRESSURE: 100 MMHG | OXYGEN SATURATION: 93 % | WEIGHT: 162.5 LBS | HEIGHT: 67 IN | BODY MASS INDEX: 25.51 KG/M2 | HEART RATE: 106 BPM

## 2025-01-26 DIAGNOSIS — R50.9 FEVER IN ADULT: ICD-10-CM

## 2025-01-26 DIAGNOSIS — R05.1 ACUTE COUGH: ICD-10-CM

## 2025-01-26 DIAGNOSIS — J10.1 INFLUENZA A: ICD-10-CM

## 2025-01-26 DIAGNOSIS — J02.9 SORE THROAT: ICD-10-CM

## 2025-01-26 LAB
FLUAV RNA SPEC QL NAA+PROBE: POSITIVE
FLUBV RNA SPEC QL NAA+PROBE: NEGATIVE
RSV RNA SPEC QL NAA+PROBE: NEGATIVE
S PYO DNA SPEC NAA+PROBE: NOT DETECTED
SARS-COV-2 RNA RESP QL NAA+PROBE: NEGATIVE

## 2025-01-26 PROCEDURE — 87651 STREP A DNA AMP PROBE: CPT | Performed by: NURSE PRACTITIONER

## 2025-01-26 PROCEDURE — 3078F DIAST BP <80 MM HG: CPT | Performed by: NURSE PRACTITIONER

## 2025-01-26 PROCEDURE — 71046 X-RAY EXAM CHEST 2 VIEWS: CPT | Mod: TC | Performed by: RADIOLOGY

## 2025-01-26 PROCEDURE — 0241U POCT CEPHEID COV-2, FLU A/B, RSV - PCR: CPT | Performed by: NURSE PRACTITIONER

## 2025-01-26 PROCEDURE — 3074F SYST BP LT 130 MM HG: CPT | Performed by: NURSE PRACTITIONER

## 2025-01-26 PROCEDURE — 99213 OFFICE O/P EST LOW 20 MIN: CPT | Performed by: NURSE PRACTITIONER

## 2025-01-26 RX ORDER — BENZONATATE 100 MG/1
100 CAPSULE ORAL 3 TIMES DAILY PRN
Qty: 60 CAPSULE | Refills: 0 | Status: SHIPPED | OUTPATIENT
Start: 2025-01-26

## 2025-01-26 RX ORDER — BRINZOLAMIDE 10 MG/ML
SUSPENSION/ DROPS OPHTHALMIC
COMMUNITY
Start: 2024-11-21

## 2025-01-26 RX ORDER — LIDOCAINE HYDROCHLORIDE 20 MG/ML
5 SOLUTION OROPHARYNGEAL 4 TIMES DAILY PRN
Qty: 120 ML | Refills: 0 | Status: SHIPPED | OUTPATIENT
Start: 2025-01-26 | End: 2025-01-26 | Stop reason: SDUPTHER

## 2025-01-26 RX ORDER — PREDNISOLONE ACETATE 10 MG/ML
SUSPENSION/ DROPS OPHTHALMIC
COMMUNITY
Start: 2025-01-03

## 2025-01-26 RX ORDER — OSELTAMIVIR PHOSPHATE 75 MG/1
75 CAPSULE ORAL 2 TIMES DAILY
Qty: 10 CAPSULE | Refills: 0 | Status: SHIPPED | OUTPATIENT
Start: 2025-01-26

## 2025-01-26 RX ORDER — TOBRAMYCIN 3 MG/ML
SOLUTION/ DROPS OPHTHALMIC
COMMUNITY
Start: 2025-01-13

## 2025-01-26 RX ORDER — BENZONATATE 100 MG/1
100 CAPSULE ORAL 3 TIMES DAILY PRN
Qty: 60 CAPSULE | Refills: 0 | Status: SHIPPED | OUTPATIENT
Start: 2025-01-26 | End: 2025-01-26 | Stop reason: SDUPTHER

## 2025-01-26 RX ORDER — LIDOCAINE HYDROCHLORIDE 20 MG/ML
5 SOLUTION OROPHARYNGEAL 4 TIMES DAILY PRN
Qty: 120 ML | Refills: 0 | Status: SHIPPED | OUTPATIENT
Start: 2025-01-26

## 2025-01-26 RX ORDER — KETOROLAC TROMETHAMINE 5 MG/ML
SOLUTION OPHTHALMIC
COMMUNITY
Start: 2024-12-11

## 2025-01-26 RX ORDER — DORZOLAMIDE HYDROCHLORIDE AND TIMOLOL MALEATE 20; 5 MG/ML; MG/ML
SOLUTION/ DROPS OPHTHALMIC
COMMUNITY
Start: 2024-12-05

## 2025-01-26 ASSESSMENT — FIBROSIS 4 INDEX: FIB4 SCORE: 1.35

## 2025-01-26 NOTE — PROGRESS NOTES
Chief Complaint   Patient presents with    Sore Throat     Severe sore throat. Cough and headache. Tested negative for covid and influenza. Patient states symptoms started Friday morning.          HISTORY OF PRESENT ILLNESS: Patient is a pleasant 65 y.o. female with a history of asthma and HTN who presents today due to symptoms which started two days ago with sudden onset. Pt reports a fever, chills, body aches. Reports associated cough, sore throat, nasal congestion, headache, and fatigue. Notes vomiting yesterday. Denies blood in sputum, chest pain, shortness of breath, wheezing, diarrhea.  Has tried OTC cold/flu medications without significant relief of symptoms. No recent ABX use. No other aggravating or alleviating factors. She did receive a flu vaccination this year.       Patient Active Problem List    Diagnosis Date Noted    Generalized anxiety disorder with panic attacks 06/21/2024    Mild intermittent asthma without complication 04/20/2023    Greater trochanteric bursitis 04/04/2019    Other muscle spasm 04/04/2019    Bronchospasm 12/05/2018    Piriformis syndrome, left 03/02/2018    Vasospastic angina (HCC) 04/10/2017    Primary hypertension 03/07/2017    Precordial pain 12/08/2016    Anxiety 08/18/2015    Encounter for general adult medical examination without abnormal findings 08/18/2015    Insomnia 08/18/2015    Knee pain, left 11/18/2014    Venous (peripheral) insufficiency 06/26/2013       Allergies:Patient has no known allergies.    Current Outpatient Medications Ordered in Epic   Medication Sig Dispense Refill    brinzolamide (AZOPT) 1 % Suspension       dorzolamide-timolol (COSOPT) 2-0.5 % Solution       ketorolac (ACULAR) 0.5 % Solution       prednisoLONE acetate (PRED FORTE) 1 % Suspension       tobramycin (TOBREX) 0.3 % Solution ophthalmic solution       amLODIPine (NORVASC) 5 MG Tab TAKE ONE TABLET BY MOUTH ONCE DAILY 90 Tablet 3    beclomethasone HFA (QVAR REDIHALER) 40 MCG/ACT inhaler Inhale  "1 Puff every day. 1 Each 11    meloxicam (MOBIC) 15 MG tablet       albuterol 108 (90 Base) MCG/ACT Aero Soln inhalation aerosol       nitroglycerin (NITROSTAT) 0.4 MG SL Tab Place 1 Tablet under the tongue as needed for Chest Pain (1 tab under the tongue every 5 minutes for chest pain, up to 3 doses in 15 minutes). 25 Tablet 6     No current Epic-ordered facility-administered medications on file.       Past Medical History:   Diagnosis Date    ASTHMA     Dental disorder     braces    H/O Prinzmetal angina     last time        Social History     Tobacco Use    Smoking status: Former     Current packs/day: 0.00     Types: Cigarettes     Quit date: 1993     Years since quittin.0    Smokeless tobacco: Never   Vaping Use    Vaping status: Never Used   Substance Use Topics    Alcohol use: Yes     Comment: 5 per week    Drug use: No       Family Status   Relation Name Status    Mo  Alive    Fa  (Not Specified)    Sis  Alive    Bro  Alive    Bro  Alive    Neg Hx  (Not Specified)   No partnership data on file     Family History   Problem Relation Age of Onset    Hypertension Mother 55        Smoker, overweight, no exercise    No Known Problems Father     Heart Attack Neg Hx     Heart Disease Neg Hx     Heart Failure Neg Hx        ROS:  Review of Systems   Constitutional: Positive for subjective fever, chills, fatigue, malaise. Negative for weight loss.  HENT: Positive for congestion and sore throat. Negative for ear pain, nosebleeds, and neck pain.    Eyes: Negative for vision changes.   Cardiovascular: Negative for chest pain, palpitations, orthopnea and leg swelling.   Respiratory: Positive for cough. Negative for shortness of breath and wheezing.   Gastrointestinal: Positive for vomiting last night. Negative for abdominal pain, diarrhea.   Skin: Negative for rash, diaphoresis.     Exam:  /62   Pulse (!) 106   Temp (!) 38.1 °C (100.5 °F) (Temporal)   Resp (!) 21   Ht 1.689 m (5' 6.5\")   Wt 73.7 " "kg (162 lb 8 oz)   SpO2 93%   General: well-nourished, well-developed female, appears uncomfortable, non-toxic in appearance.   Head: normocephalic, atraumatic.  Eyes: PERRLA, EOM within normal limits, no conjunctival injection, no scleral icterus, visual fields and acuity grossly intact.  Ears: normal shape and symmetry, no tenderness, no discharge. External canals are without any significant edema or erythema. Tympanic membranes are without any inflammation, no effusion. Gross auditory acuity is intact.  Nose: symmetrical without tenderness, mild discharge, erythema present bilateral nares.  Mouth/Throat: reasonable hygiene, no exudates or tonsillar enlargement. Erythema present.   Neck: no masses, range of motion within normal limits, no tracheal deviation.  Lymph: mild cervical adenopathy. No supraclavicular adenopathy.   Neuro: alert and oriented. Cranial nerves 1-12 grossly intact.   Cardiovascular: tachycardic rate and regular rhythm without murmurs, rubs, or gallops. No edema.   Pulmonary: no distress. Chest is symmetrical with respiration, no wheezes, crackles, or rhonchi.   Musculoskeletal: appropriate muscle tone, gait is stable.  Skin: warm, dry, intact, no clubbing, no cyanosis.   Psych: appropriate mood, affect, judgement.         Lab Results/POC Test Results   Results for orders placed or performed in visit on 01/26/25   POCT CoV-2, Flu A/B, RSV by PCR    Collection Time: 01/26/25  8:50 AM   Result Value Ref Range    SARS-CoV-2 by PCR Negative Negative, Invalid    Influenza virus A RNA Positive (A) Negative, Invalid    Influenza virus B, PCR Negative Negative, Invalid    RSV, PCR Negative Negative, Invalid   POCT GROUP A STREP, PCR    Collection Time: 01/26/25  8:50 AM   Result Value Ref Range    POC Group A Strep, PCR Not Detected Not Detected, Invalid             DX chest radiology reading \"No acute process.\"          Assessment/Plan:  1. Influenza A  POCT CoV-2, Flu A/B, RSV by PCR    POCT GROUP A " STREP, PCR    DX-CHEST-2 VIEWS      2. Sore throat  lidocaine (XYLOCAINE) 2 % Solution      3. Acute cough  benzonatate (TESSALON) 100 MG Cap                Discussed viral etiology, self limiting illness. Did not see any evidence of a bacterial process. Discussed natural progression and sx care. Rest, increase fluid intake, hand and respiratory hygiene. OTC cough/cold medications as directed.  Lidocaine and Tessalon as needed.  Supportive care, differential diagnoses, and indications for immediate follow-up discussed with patient.   Pathogenesis of diagnosis discussed including typical length and natural progression.   Instructed to return to clinic or nearest emergency department for any change in condition, further concerns, or worsening of symptoms.  Patient states understanding of the plan of care and discharge instructions.  Instructed to make an appointment, for follow up, with her primary care provider.            Please note that this dictation was created using voice recognition software. I have made every reasonable attempt to correct obvious errors, but I expect that there are errors of grammar and possibly content that I did not discover before finalizing the note.       AURA Crystal.

## 2025-03-25 ENCOUNTER — PATIENT MESSAGE (OUTPATIENT)
Dept: MEDICAL GROUP | Facility: MEDICAL CENTER | Age: 65
End: 2025-03-25
Payer: COMMERCIAL

## 2025-03-25 DIAGNOSIS — Z12.31 ENCOUNTER FOR SCREENING MAMMOGRAM FOR MALIGNANT NEOPLASM OF BREAST: ICD-10-CM

## 2025-03-25 DIAGNOSIS — D75.89 MACROCYTOSIS WITHOUT ANEMIA: ICD-10-CM

## 2025-03-25 DIAGNOSIS — J45.20 MILD INTERMITTENT ASTHMA WITHOUT COMPLICATION: ICD-10-CM

## 2025-03-25 DIAGNOSIS — Z01.818 PREOP EXAMINATION: ICD-10-CM

## 2025-03-25 DIAGNOSIS — I10 PRIMARY HYPERTENSION: ICD-10-CM

## 2025-03-25 DIAGNOSIS — R07.2 PRECORDIAL PAIN: ICD-10-CM

## 2025-03-25 DIAGNOSIS — F41.9 ANXIETY: ICD-10-CM

## 2025-03-25 DIAGNOSIS — F41.0 GENERALIZED ANXIETY DISORDER WITH PANIC ATTACKS: ICD-10-CM

## 2025-03-25 DIAGNOSIS — F41.1 GENERALIZED ANXIETY DISORDER WITH PANIC ATTACKS: ICD-10-CM

## 2025-03-27 ENCOUNTER — APPOINTMENT (OUTPATIENT)
Dept: RADIOLOGY | Facility: MEDICAL CENTER | Age: 65
End: 2025-03-27
Attending: FAMILY MEDICINE
Payer: COMMERCIAL

## 2025-03-27 DIAGNOSIS — R07.2 PRECORDIAL PAIN: ICD-10-CM

## 2025-03-27 DIAGNOSIS — Z01.818 PREOP EXAMINATION: ICD-10-CM

## 2025-03-27 PROCEDURE — 71046 X-RAY EXAM CHEST 2 VIEWS: CPT

## 2025-03-28 ENCOUNTER — HOSPITAL ENCOUNTER (OUTPATIENT)
Dept: LAB | Facility: MEDICAL CENTER | Age: 65
End: 2025-03-28
Attending: FAMILY MEDICINE
Payer: COMMERCIAL

## 2025-03-28 ENCOUNTER — RESULTS FOLLOW-UP (OUTPATIENT)
Dept: MEDICAL GROUP | Facility: MEDICAL CENTER | Age: 65
End: 2025-03-28
Payer: COMMERCIAL

## 2025-03-28 DIAGNOSIS — F41.1 GENERALIZED ANXIETY DISORDER WITH PANIC ATTACKS: ICD-10-CM

## 2025-03-28 DIAGNOSIS — R07.2 PRECORDIAL PAIN: ICD-10-CM

## 2025-03-28 DIAGNOSIS — J45.20 MILD INTERMITTENT ASTHMA WITHOUT COMPLICATION: ICD-10-CM

## 2025-03-28 DIAGNOSIS — Z01.818 PREOP EXAMINATION: ICD-10-CM

## 2025-03-28 DIAGNOSIS — F41.0 GENERALIZED ANXIETY DISORDER WITH PANIC ATTACKS: ICD-10-CM

## 2025-03-28 DIAGNOSIS — I10 PRIMARY HYPERTENSION: ICD-10-CM

## 2025-03-28 DIAGNOSIS — D75.89 MACROCYTOSIS WITHOUT ANEMIA: ICD-10-CM

## 2025-03-28 LAB
ALBUMIN SERPL BCP-MCNC: 4.3 G/DL (ref 3.2–4.9)
ALBUMIN/GLOB SERPL: 1.7 G/DL
ALP SERPL-CCNC: 61 U/L (ref 30–99)
ALT SERPL-CCNC: 17 U/L (ref 2–50)
ANION GAP SERPL CALC-SCNC: 11 MMOL/L (ref 7–16)
APTT PPP: 29.2 SEC (ref 24.7–36)
AST SERPL-CCNC: 25 U/L (ref 12–45)
BASOPHILS # BLD AUTO: 0.7 % (ref 0–1.8)
BASOPHILS # BLD: 0.03 K/UL (ref 0–0.12)
BILIRUB SERPL-MCNC: 0.3 MG/DL (ref 0.1–1.5)
BUN SERPL-MCNC: 11 MG/DL (ref 8–22)
CALCIUM ALBUM COR SERPL-MCNC: 8.8 MG/DL (ref 8.5–10.5)
CALCIUM SERPL-MCNC: 9 MG/DL (ref 8.5–10.5)
CHLORIDE SERPL-SCNC: 107 MMOL/L (ref 96–112)
CHOLEST SERPL-MCNC: 162 MG/DL (ref 100–199)
CO2 SERPL-SCNC: 25 MMOL/L (ref 20–33)
CREAT SERPL-MCNC: 0.83 MG/DL (ref 0.5–1.4)
EOSINOPHIL # BLD AUTO: 0.07 K/UL (ref 0–0.51)
EOSINOPHIL NFR BLD: 1.6 % (ref 0–6.9)
ERYTHROCYTE [DISTWIDTH] IN BLOOD BY AUTOMATED COUNT: 46 FL (ref 35.9–50)
EST. AVERAGE GLUCOSE BLD GHB EST-MCNC: 108 MG/DL
FOLATE SERPL-MCNC: 24.2 NG/ML
GFR SERPLBLD CREATININE-BSD FMLA CKD-EPI: 79 ML/MIN/1.73 M 2
GLOBULIN SER CALC-MCNC: 2.5 G/DL (ref 1.9–3.5)
GLUCOSE SERPL-MCNC: 88 MG/DL (ref 65–99)
HBA1C MFR BLD: 5.4 % (ref 4–5.6)
HCT VFR BLD AUTO: 43.7 % (ref 37–47)
HDLC SERPL-MCNC: 58 MG/DL
HGB BLD-MCNC: 14.2 G/DL (ref 12–16)
IMM GRANULOCYTES # BLD AUTO: 0.01 K/UL (ref 0–0.11)
IMM GRANULOCYTES NFR BLD AUTO: 0.2 % (ref 0–0.9)
INR PPP: 0.97 (ref 0.87–1.13)
LDLC SERPL CALC-MCNC: 92 MG/DL
LYMPHOCYTES # BLD AUTO: 1.54 K/UL (ref 1–4.8)
LYMPHOCYTES NFR BLD: 35.2 % (ref 22–41)
MCH RBC QN AUTO: 33.1 PG (ref 27–33)
MCHC RBC AUTO-ENTMCNC: 32.5 G/DL (ref 32.2–35.5)
MCV RBC AUTO: 101.9 FL (ref 81.4–97.8)
MONOCYTES # BLD AUTO: 0.33 K/UL (ref 0–0.85)
MONOCYTES NFR BLD AUTO: 7.5 % (ref 0–13.4)
NEUTROPHILS # BLD AUTO: 2.4 K/UL (ref 1.82–7.42)
NEUTROPHILS NFR BLD: 54.8 % (ref 44–72)
NRBC # BLD AUTO: 0 K/UL
NRBC BLD-RTO: 0 /100 WBC (ref 0–0.2)
PLATELET # BLD AUTO: 271 K/UL (ref 164–446)
PMV BLD AUTO: 10.3 FL (ref 9–12.9)
POTASSIUM SERPL-SCNC: 4.3 MMOL/L (ref 3.6–5.5)
PROT SERPL-MCNC: 6.8 G/DL (ref 6–8.2)
PROTHROMBIN TIME: 12.9 SEC (ref 12–14.6)
RBC # BLD AUTO: 4.29 M/UL (ref 4.2–5.4)
SODIUM SERPL-SCNC: 143 MMOL/L (ref 135–145)
TRIGL SERPL-MCNC: 61 MG/DL (ref 0–149)
TSH SERPL DL<=0.005 MIU/L-ACNC: 4.13 UIU/ML (ref 0.38–5.33)
VIT B12 SERPL-MCNC: 678 PG/ML (ref 211–911)
WBC # BLD AUTO: 4.4 K/UL (ref 4.8–10.8)

## 2025-03-28 PROCEDURE — 85730 THROMBOPLASTIN TIME PARTIAL: CPT

## 2025-03-28 PROCEDURE — 82607 VITAMIN B-12: CPT

## 2025-03-28 PROCEDURE — 80053 COMPREHEN METABOLIC PANEL: CPT

## 2025-03-28 PROCEDURE — 85025 COMPLETE CBC W/AUTO DIFF WBC: CPT

## 2025-03-28 PROCEDURE — 84443 ASSAY THYROID STIM HORMONE: CPT

## 2025-03-28 PROCEDURE — 36415 COLL VENOUS BLD VENIPUNCTURE: CPT

## 2025-03-28 PROCEDURE — 83036 HEMOGLOBIN GLYCOSYLATED A1C: CPT

## 2025-03-28 PROCEDURE — 80061 LIPID PANEL: CPT

## 2025-03-28 PROCEDURE — 85610 PROTHROMBIN TIME: CPT

## 2025-03-28 PROCEDURE — 82746 ASSAY OF FOLIC ACID SERUM: CPT

## 2025-03-31 ENCOUNTER — RESULTS FOLLOW-UP (OUTPATIENT)
Dept: MEDICAL GROUP | Facility: MEDICAL CENTER | Age: 65
End: 2025-03-31
Payer: COMMERCIAL

## 2025-04-04 ENCOUNTER — OFFICE VISIT (OUTPATIENT)
Dept: URGENT CARE | Facility: CLINIC | Age: 65
End: 2025-04-04
Payer: COMMERCIAL

## 2025-04-04 VITALS
WEIGHT: 149.3 LBS | BODY MASS INDEX: 23.43 KG/M2 | RESPIRATION RATE: 16 BRPM | HEART RATE: 57 BPM | OXYGEN SATURATION: 98 % | SYSTOLIC BLOOD PRESSURE: 126 MMHG | TEMPERATURE: 97.5 F | DIASTOLIC BLOOD PRESSURE: 84 MMHG | HEIGHT: 67 IN

## 2025-04-04 DIAGNOSIS — H92.02 LEFT EAR PAIN: ICD-10-CM

## 2025-04-04 PROCEDURE — 3074F SYST BP LT 130 MM HG: CPT | Performed by: FAMILY MEDICINE

## 2025-04-04 PROCEDURE — 99213 OFFICE O/P EST LOW 20 MIN: CPT | Performed by: FAMILY MEDICINE

## 2025-04-04 PROCEDURE — 3079F DIAST BP 80-89 MM HG: CPT | Performed by: FAMILY MEDICINE

## 2025-04-04 RX ORDER — DIAZEPAM 5 MG/1
5 TABLET ORAL
Qty: 30 TABLET | Refills: 0 | Status: SHIPPED | OUTPATIENT
Start: 2025-04-04 | End: 2025-07-03

## 2025-04-04 ASSESSMENT — FIBROSIS 4 INDEX: FIB4 SCORE: 1.41

## 2025-04-04 NOTE — PROGRESS NOTES
"  Subjective:      65 y.o. female presents to urgent care for left ear pain that started last night.  There was no inciting event or trauma at that time.  No other cold symptoms such as fever, sore throat, or cough.  No tobacco product use.  She does have asthma for which she uses Qvar and albuterol as needed.  She has not been using the albuterol anymore since last night.  She is vaccinated against COVID.  No known sick contacts.    She denies any other questions or concerns at this time.    Current problem list, medication, and past medical/surgical history were reviewed in Epic.    ROS  See HPI     Objective:      /84   Pulse (!) 57   Temp 36.4 °C (97.5 °F) (Temporal)   Resp 16   Ht 1.702 m (5' 7\")   Wt 67.7 kg (149 lb 4.8 oz)   SpO2 98%   BMI 23.38 kg/m²     Physical Exam  Constitutional:       General: She is not in acute distress.     Appearance: She is not diaphoretic.   HENT:      Right Ear: Tympanic membrane, ear canal and external ear normal.      Left Ear: Tympanic membrane, ear canal and external ear normal.      Ears:      Comments: No pain with manipulation of tragus bilaterally  Cardiovascular:      Rate and Rhythm: Normal rate and regular rhythm.      Heart sounds: Normal heart sounds.   Pulmonary:      Effort: Pulmonary effort is normal. No respiratory distress.      Breath sounds: Normal breath sounds.   Neurological:      Mental Status: She is alert.   Psychiatric:         Mood and Affect: Affect normal.         Judgment: Judgment normal.       Assessment/Plan:     1. Left ear pain  No signs of bacterial infection on physical exam. Most consistent with eustachian tube dysfunction.  She was encouraged to use Flonase as needed for symptomatic relief.      Instructed to return to Urgent Care or nearest Emergency Department if symptoms fail to improve, for any change in condition, further concerns, or new concerning symptoms. Patient states understanding of the plan of care and discharge " instructions.    Ana Laura Jaffe M.D.

## 2025-04-07 ENCOUNTER — HOSPITAL ENCOUNTER (OUTPATIENT)
Dept: LAB | Facility: MEDICAL CENTER | Age: 65
End: 2025-04-07
Attending: FAMILY MEDICINE
Payer: COMMERCIAL

## 2025-04-07 ENCOUNTER — OFFICE VISIT (OUTPATIENT)
Dept: MEDICAL GROUP | Facility: PHYSICIAN GROUP | Age: 65
End: 2025-04-07
Payer: COMMERCIAL

## 2025-04-07 VITALS
WEIGHT: 149.8 LBS | DIASTOLIC BLOOD PRESSURE: 70 MMHG | HEART RATE: 70 BPM | OXYGEN SATURATION: 95 % | TEMPERATURE: 97.6 F | SYSTOLIC BLOOD PRESSURE: 112 MMHG | HEIGHT: 67 IN | BODY MASS INDEX: 23.51 KG/M2

## 2025-04-07 DIAGNOSIS — Z12.11 COLON CANCER SCREENING: ICD-10-CM

## 2025-04-07 DIAGNOSIS — R51.9 TEMPORAL PAIN: ICD-10-CM

## 2025-04-07 DIAGNOSIS — R51.9 TEMPORAL PAIN: Primary | ICD-10-CM

## 2025-04-07 LAB — ERYTHROCYTE [SEDIMENTATION RATE] IN BLOOD BY WESTERGREN METHOD: 5 MM/HOUR (ref 0–25)

## 2025-04-07 PROCEDURE — 36415 COLL VENOUS BLD VENIPUNCTURE: CPT

## 2025-04-07 PROCEDURE — 3074F SYST BP LT 130 MM HG: CPT | Performed by: FAMILY MEDICINE

## 2025-04-07 PROCEDURE — 3078F DIAST BP <80 MM HG: CPT | Performed by: FAMILY MEDICINE

## 2025-04-07 PROCEDURE — 86140 C-REACTIVE PROTEIN: CPT

## 2025-04-07 PROCEDURE — 99213 OFFICE O/P EST LOW 20 MIN: CPT | Performed by: FAMILY MEDICINE

## 2025-04-07 PROCEDURE — 85652 RBC SED RATE AUTOMATED: CPT

## 2025-04-07 RX ORDER — GABAPENTIN 300 MG/1
300 CAPSULE ORAL 3 TIMES DAILY
Qty: 90 CAPSULE | Refills: 0 | Status: SHIPPED | OUTPATIENT
Start: 2025-04-07 | End: 2025-04-22

## 2025-04-07 ASSESSMENT — PATIENT HEALTH QUESTIONNAIRE - PHQ9: CLINICAL INTERPRETATION OF PHQ2 SCORE: 0

## 2025-04-07 ASSESSMENT — FIBROSIS 4 INDEX: FIB4 SCORE: 1.41

## 2025-04-07 NOTE — PROGRESS NOTES
Verbal consent was acquired by the patient to use [employer] ambient listening note generation during this visit     Subjective:     HPI:   History of Present Illness  The patient is a 65-year-old female who presents for evaluation of ear and temple pain.    Ear Pain  - Experienced an abrupt onset of severe, stabbing pain in her left ear on Thursday night, intense enough to disrupt her sleep  - Pain described as akin to being poked with a needle, occurring every 1 to 2 minutes with periods of relief in between  - No associated fevers or chills, changes in vision or hearing, or radiation of the pain to the jaw or cheekbone  - Pain similar to an electric shock, exacerbated by combing her hair over the temple area  - No facial weakness or swelling in the affected area  - Pain more bothersome at night due to interference with sleep  - Urgent care visit on Friday afternoon resulted in a diagnosis of eustachian tube dysfunction and a prescription for Flonase, which proved ineffective  - Over-the-counter analgesics such as Tylenol and ibuprofen failed to alleviate the pain  - Used an old bottle of codeine cough syrup, which did not provide relief  - Warm compress offered some respite    [place] Pain  - Pain migrated from the ear to the temple, maintaining its stabbing quality and frequency  - By Saturday night, pain ascended to her scalp, manifesting as a pulling sensation and scalp tenderness  - Intensity of pain varies, reaching a peak of 9 out of 10 at its worst, lasting for about a second  - Found some relief with heat and pressure, allowing her to get some sleep    Supplemental information: She reports no history of diabetes or pacemaker implantation. She experiences claustrophobia in dark environments. She underwent a Cologuard test during the COVID-19 pandemic, which yielded normal results. She is currently scheduled for a mammogram and cardiovascular disease screening. Her last Pap smear was conducted 2 years ago  "by her gynecologist, which was normal and she is HPV negative. She has not engaged in sexual activity since her last Pap smear. She had measles as a child and has received the MMR vaccine.    MEDICATIONS  Current: Flonase, Tylenol, ibuprofen    IMMUNIZATIONS  She has received the MMR vaccine.    Health Maintenance: Completed    Objective:     Exam:  /70 (BP Location: Left arm, Patient Position: Sitting, BP Cuff Size: Adult)   Pulse 70   Temp 36.4 °C (97.6 °F) (Temporal)   Ht 1.702 m (5' 7\")   Wt 67.9 kg (149 lb 12.8 oz)   SpO2 95%   BMI 23.46 kg/m²  Body mass index is 23.46 kg/m².    Physical Exam  Constitutional:       Appearance: Normal appearance.   Cardiovascular:      Rate and Rhythm: Normal rate and regular rhythm.      Heart sounds: Normal heart sounds.   Pulmonary:      Effort: Pulmonary effort is normal.      Breath sounds: Normal breath sounds.   Musculoskeletal:      Cervical back: Normal range of motion and neck supple.   Lymphadenopathy:      Cervical: No cervical adenopathy.   Neurological:      Mental Status: She is alert.             Results      Assessment & Plan:     1. Temporal pain  Sed Rate    CRP QUANTITIVE (NON-CARDIAC)    MR-BRAIN-WITH & W/O    MR-MRA HEAD-WITH & W/O AND SEQUENCES      2. Colon cancer screening  Cologuard® colon cancer screening          Assessment & Plan  1. Temporal pain: Acute.  Intermittent episodes of severe sharp/stabbing pain that started in the ear but now is more of to the temple and the scalp on the left side.  She has hyperalgesia when you brush over the scalp above the temple on the left side.  My differential includes trigeminal neuralgia versus giant cell arteritis.  - Gabapentin 300 mg up to three times daily as needed for nerve pain management.  - Discussed potential side effects including drowsiness, sleepiness, and dizziness.  - Brain MRI with contrast and MRA to exclude structural causes contributing to trigeminal neuralgia  - ESR/CRP to " evaluate for inflammation  - If ESR or CRP are elevated, refer to vascular surgery for temporal artery biopsy    2. Health maintenance.  - Cologuard test for colon cancer screening to be sent to home.  - Scheduled mammogram and cardiac disease screening.  - Advised to have PCP request records of last Pap smear    Return if symptoms worsen or fail to improve.    Please note that this dictation was created using voice recognition software. I have made every reasonable attempt to correct obvious errors, but I expect that there are errors of grammar and possibly content that I did not discover before finalizing the note.

## 2025-04-08 ENCOUNTER — RESULTS FOLLOW-UP (OUTPATIENT)
Dept: MEDICAL GROUP | Facility: PHYSICIAN GROUP | Age: 65
End: 2025-04-08
Payer: COMMERCIAL

## 2025-04-08 LAB — CRP SERPL HS-MCNC: <0.3 MG/DL (ref 0–0.75)

## 2025-04-11 ENCOUNTER — HOSPITAL ENCOUNTER (OUTPATIENT)
Dept: RADIOLOGY | Facility: MEDICAL CENTER | Age: 65
End: 2025-04-11
Attending: FAMILY MEDICINE
Payer: COMMERCIAL

## 2025-04-11 DIAGNOSIS — I20.1 PRINZMETAL ANGINA (HCC): ICD-10-CM

## 2025-04-11 DIAGNOSIS — R07.2 PRECORDIAL PAIN: ICD-10-CM

## 2025-04-11 PROCEDURE — 4410556 CT-CARDIAC SCORING (SELF PAY ONLY)

## 2025-04-13 ENCOUNTER — RESULTS FOLLOW-UP (OUTPATIENT)
Dept: MEDICAL GROUP | Facility: MEDICAL CENTER | Age: 65
End: 2025-04-13
Payer: COMMERCIAL

## 2025-04-16 ENCOUNTER — OFFICE VISIT (OUTPATIENT)
Dept: MEDICAL GROUP | Facility: PHYSICIAN GROUP | Age: 65
End: 2025-04-16
Payer: COMMERCIAL

## 2025-04-16 VITALS
HEART RATE: 56 BPM | BODY MASS INDEX: 23.29 KG/M2 | HEIGHT: 67 IN | SYSTOLIC BLOOD PRESSURE: 120 MMHG | DIASTOLIC BLOOD PRESSURE: 70 MMHG | WEIGHT: 148.4 LBS | TEMPERATURE: 97.2 F | OXYGEN SATURATION: 98 %

## 2025-04-16 DIAGNOSIS — G50.0 TRIGEMINAL NEURALGIA OF LEFT SIDE OF FACE: Primary | ICD-10-CM

## 2025-04-16 DIAGNOSIS — R68.89 EAR SYMPTOM: ICD-10-CM

## 2025-04-16 PROCEDURE — 3078F DIAST BP <80 MM HG: CPT | Performed by: FAMILY MEDICINE

## 2025-04-16 PROCEDURE — 99213 OFFICE O/P EST LOW 20 MIN: CPT | Performed by: FAMILY MEDICINE

## 2025-04-16 PROCEDURE — 3074F SYST BP LT 130 MM HG: CPT | Performed by: FAMILY MEDICINE

## 2025-04-16 RX ORDER — DULOXETIN HYDROCHLORIDE 30 MG/1
30 CAPSULE, DELAYED RELEASE ORAL DAILY
Qty: 90 CAPSULE | Refills: 1 | Status: SHIPPED | OUTPATIENT
Start: 2025-04-16

## 2025-04-16 ASSESSMENT — FIBROSIS 4 INDEX: FIB4 SCORE: 1.41

## 2025-04-16 NOTE — PROGRESS NOTES
"Verbal consent was acquired by the patient to use [employer] ambient listening note generation during this visit     Subjective:     HPI:   History of Present Illness  The patient presents for evaluation of trigeminal neuralgia.    Trigeminal Neuralgia  - Current medication, gabapentin, is effective in managing her pain  - Experiences side effects, particularly affecting her mood  - No recent breakthrough pain, which previously persisted for several days  - Dissatisfaction with the impact of the medication on her mood  - Interested in transitioning to a once-daily medication  - Reduced gabapentin dosage to twice daily on 04/12/2025  - MRI is scheduled for next week to further evaluate her condition    Left Ear Examination  - Suspects a minor sore due to the use of hot packs  - No associated pain or drainage from the ear reported    Supplemental information: No recent episodes of chest pain, respiratory distress, fevers, or chills are reported.    Health Maintenance: Completed    Objective:     Exam:  /70 (BP Location: Right arm, Patient Position: Sitting, BP Cuff Size: Adult)   Pulse (!) 56   Temp 36.2 °C (97.2 °F) (Temporal)   Ht 1.702 m (5' 7\")   Wt 67.3 kg (148 lb 6.4 oz)   SpO2 98%   BMI 23.24 kg/m²  Body mass index is 23.24 kg/m².    Physical Exam  Constitutional:       Appearance: Normal appearance.   Cardiovascular:      Rate and Rhythm: Normal rate and regular rhythm.      Heart sounds: Normal heart sounds.   Pulmonary:      Effort: Pulmonary effort is normal.      Breath sounds: Normal breath sounds.   Musculoskeletal:      Cervical back: Normal range of motion and neck supple.   Lymphadenopathy:      Cervical: No cervical adenopathy.   Neurological:      Mental Status: She is alert.             Results  Labs   - Inflammatory markers: Negative    Assessment & Plan:     1. Trigeminal neuralgia of left side of face        2. Ear symptom            Assessment & Plan  1. Trigeminal neuralgia: " Acute.  Currently the pain is well-controlled with gabapentin but she is getting side effects.  - Initiate tapering plan for gabapentin, reducing the dose by one pill every 7 days until discontinuation.  - Start duloxetine 30 mg daily.  - If pain is not adequately managed after a week, increase duloxetine to 60 mg daily.  - MRI of the brain and MRA of the head scheduled for next week.    2. Left ear scabbing: Acute.  - Advise to leave the area alone to allow it to heal on its own.    3. Health maintenance.  - Cologuard test ordered.  - Send back the sample once collected.  - Results will be communicated via [employer].      Return if symptoms worsen or fail to improve.    Please note that this dictation was created using voice recognition software. I have made every reasonable attempt to correct obvious errors, but I expect that there are errors of grammar and possibly content that I did not discover before finalizing the note.

## 2025-04-18 ENCOUNTER — HOSPITAL ENCOUNTER (OUTPATIENT)
Dept: RADIOLOGY | Facility: MEDICAL CENTER | Age: 65
End: 2025-04-18
Attending: PHYSICIAN ASSISTANT
Payer: COMMERCIAL

## 2025-04-18 DIAGNOSIS — M25.522 LEFT ELBOW PAIN: ICD-10-CM
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
## 2025-04-18 DIAGNOSIS — S52.022A CLOSED OLECRANON FRACTURE, LEFT, INITIAL ENCOUNTER: ICD-10-CM

## 2025-04-18 PROCEDURE — 73200 CT UPPER EXTREMITY W/O DYE: CPT | Mod: LT

## 2025-04-22 PROBLEM — M77.42 METATARSALGIA OF LEFT FOOT: Status: ACTIVE | Noted: 2025-04-22

## 2025-04-22 PROBLEM — M20.42 HAMMER TOE OF LEFT FOOT: Status: ACTIVE | Noted: 2025-04-22

## 2025-04-22 PROBLEM — M25.522 LEFT ELBOW PAIN: Status: ACTIVE | Noted: 2025-04-22

## 2025-04-24 ENCOUNTER — RESULTS FOLLOW-UP (OUTPATIENT)
Dept: MEDICAL GROUP | Facility: PHYSICIAN GROUP | Age: 65
End: 2025-04-24

## 2025-04-24 ENCOUNTER — HOSPITAL ENCOUNTER (OUTPATIENT)
Dept: RADIOLOGY | Facility: MEDICAL CENTER | Age: 65
End: 2025-04-24
Attending: FAMILY MEDICINE
Payer: COMMERCIAL

## 2025-04-24 DIAGNOSIS — R68.89 EAR SYMPTOM: ICD-10-CM

## 2025-04-24 DIAGNOSIS — G50.0 TRIGEMINAL NEURALGIA OF LEFT SIDE OF FACE: ICD-10-CM

## 2025-04-24 DIAGNOSIS — R51.9 TEMPORAL PAIN: ICD-10-CM

## 2025-04-24 PROCEDURE — 70553 MRI BRAIN STEM W/O & W/DYE: CPT

## 2025-04-24 PROCEDURE — A9579 GAD-BASE MR CONTRAST NOS,1ML: HCPCS | Mod: JZ | Performed by: FAMILY MEDICINE

## 2025-04-24 PROCEDURE — 70544 MR ANGIOGRAPHY HEAD W/O DYE: CPT

## 2025-04-24 PROCEDURE — 700117 HCHG RX CONTRAST REV CODE 255: Mod: JZ | Performed by: FAMILY MEDICINE

## 2025-04-24 RX ADMIN — GADOTERIDOL 14 ML: 279.3 INJECTION, SOLUTION INTRAVENOUS at 11:38

## 2025-05-02 ENCOUNTER — TELEPHONE (OUTPATIENT)
Dept: CARDIOLOGY | Facility: MEDICAL CENTER | Age: 65
End: 2025-05-02
Payer: COMMERCIAL

## 2025-05-02 NOTE — TELEPHONE ENCOUNTER
Spoke to patient in regards to records for NP appointment with [facility].     Patient has seen a cardiologist before?  Yes   If yes, where?: [facility]    Any recent cardiac testing outside of [facility]?  No    Were any records requested?  No

## 2025-05-05 PROBLEM — S52.022A CLOSED OLECRANON FRACTURE, LEFT, INITIAL ENCOUNTER: Status: ACTIVE | Noted: 2025-05-05

## 2025-05-13 ENCOUNTER — HOSPITAL ENCOUNTER (OUTPATIENT)
Dept: RADIOLOGY | Facility: MEDICAL CENTER | Age: 65
End: 2025-05-13
Attending: FAMILY MEDICINE
Payer: COMMERCIAL

## 2025-05-13 DIAGNOSIS — R51.9 TEMPORAL PAIN: ICD-10-CM

## 2025-05-13 DIAGNOSIS — R68.89 EAR SYMPTOM: ICD-10-CM

## 2025-05-13 DIAGNOSIS — G50.0 TRIGEMINAL NEURALGIA OF LEFT SIDE OF FACE: ICD-10-CM

## 2025-05-13 PROCEDURE — A9579 GAD-BASE MR CONTRAST NOS,1ML: HCPCS | Mod: JZ | Performed by: FAMILY MEDICINE

## 2025-05-13 PROCEDURE — 700117 HCHG RX CONTRAST REV CODE 255: Mod: JZ | Performed by: FAMILY MEDICINE

## 2025-05-13 PROCEDURE — 70553 MRI BRAIN STEM W/O & W/DYE: CPT

## 2025-05-13 RX ADMIN — GADOTERIDOL 15 ML: 279.3 INJECTION, SOLUTION INTRAVENOUS at 13:42

## 2025-05-14 ENCOUNTER — RESULTS FOLLOW-UP (OUTPATIENT)
Dept: MEDICAL GROUP | Facility: MEDICAL CENTER | Age: 65
End: 2025-05-14
Payer: COMMERCIAL

## 2025-05-17 LAB — NONINV COLON CA DNA+OCC BLD SCRN STL QL: NEGATIVE

## 2025-05-19 ENCOUNTER — APPOINTMENT (OUTPATIENT)
Dept: MEDICAL GROUP | Facility: MEDICAL CENTER | Age: 65
End: 2025-05-19
Payer: COMMERCIAL

## 2025-05-19 ENCOUNTER — RESULTS FOLLOW-UP (OUTPATIENT)
Dept: MEDICAL GROUP | Facility: PHYSICIAN GROUP | Age: 65
End: 2025-05-19

## 2025-05-19 VITALS
HEART RATE: 72 BPM | HEIGHT: 67 IN | BODY MASS INDEX: 22.51 KG/M2 | RESPIRATION RATE: 18 BRPM | WEIGHT: 143.4 LBS | DIASTOLIC BLOOD PRESSURE: 62 MMHG | TEMPERATURE: 97.5 F | SYSTOLIC BLOOD PRESSURE: 110 MMHG | OXYGEN SATURATION: 98 %

## 2025-05-19 DIAGNOSIS — R45.84 ANHEDONIA: ICD-10-CM

## 2025-05-19 DIAGNOSIS — R26.89 BALANCE PROBLEM: ICD-10-CM

## 2025-05-19 DIAGNOSIS — G50.0 TRIGEMINAL NEURALGIA OF LEFT SIDE OF FACE: Primary | ICD-10-CM

## 2025-05-19 DIAGNOSIS — Z86.73 HISTORY OF CEREBRAL INFARCTION: ICD-10-CM

## 2025-05-19 PROCEDURE — 99214 OFFICE O/P EST MOD 30 MIN: CPT | Performed by: FAMILY MEDICINE

## 2025-05-19 PROCEDURE — 3078F DIAST BP <80 MM HG: CPT | Performed by: FAMILY MEDICINE

## 2025-05-19 PROCEDURE — 3074F SYST BP LT 130 MM HG: CPT | Performed by: FAMILY MEDICINE

## 2025-05-19 ASSESSMENT — FIBROSIS 4 INDEX: FIB4 SCORE: 1.41

## 2025-05-19 NOTE — PROGRESS NOTES
"Verbal consent was acquired by the patient to use [employer] ambient listening note generation during this visit    Subjective:     CC: \"trigeminal neuralgia\"    History of Present Illness  The patient presents for evaluation of trigeminal neuralgia, balance issues, and medication management.    Trigeminal Neuralgia  She has been diagnosed with trigeminal neuralgia, which is believed to be caused by a lateral vein affecting the trigeminal nerve. She reports no facial asymmetry or changes in facial movement. She is considering a consultation at the [facility] in [place] to explore her treatment options. She is currently on Cymbalta 30 mg, which has effectively managed her pain without any breakthrough episodes. However, she is contemplating discontinuing this medication to assess if the pain recurs. If the pain does not return, she questions the necessity of continuing Cymbalta, given its adverse effects on her mood.  - Onset: Diagnosed with trigeminal neuralgia.  - Location: Trigeminal nerve.  - Character: Pain managed effectively with Cymbalta 30 mg.  - Alleviating/Aggravating Factors: Considering discontinuation of Cymbalta due to mood swings and anhedonia.  - Severity: No breakthrough episodes of pain.    Balance Issues  She has concerns about her balance, particularly after experiencing two falls within a week, one of which resulted in a fractured elbow that required surgical intervention. She suspects that gabapentin may have contributed to her instability. She has undergone foot surgery, which has further affected her balance and mobility. She plans to consult a physical therapist to improve her strength and balance. Despite these challenges, she has resumed running.  - Onset: Two falls within a week.  - Location: Balance and mobility issues.  - Duration: Ongoing since the falls and foot surgery.  - Character: Instability possibly linked to gabapentin; fractured elbow requiring surgery.  - " "Alleviating/Aggravating Factors: Consulting a physical therapist; resumed running.  - Severity: Significant enough to cause falls and a fractured elbow.    Medication Management  She expresses a desire to discontinue Cymbalta due to its side effects, which include severe mood swings and anhedonia. She recalls an incident where she experienced emotional distress during a run, an activity she typically enjoys. She also reports that gabapentin was ineffective in managing her symptoms and also induced mood swings. She is aware of the potential risks associated with abrupt discontinuation of these medications.  - Character: Severe mood swings and anhedonia from Cymbalta; ineffective symptom management and mood swings from gabapentin.  - Alleviating/Aggravating Factors: Considering discontinuation of Cymbalta; aware of risks of abrupt discontinuation.    Prinzmetal Angina and Vasospasms  She has a history of Prinzmetal angina and vasospasms, and she speculates that these could have led to clot formation. She reports no current chest pain and believes her condition is well-managed.  - Character: History of Prinzmetal angina and vasospasms.  - Severity: No current chest pain; condition well-managed.    She has never experienced panic attacks but occasionally uses diazepam for anxiety. She is scheduled for plastic surgery on 06/02/2025 and will undergo a mammogram and Pap smear. She does not have a regular gynecologist.    PAST SURGICAL HISTORY:  - Macular hole surgery in 02/2025  - Elbow surgery in 04/2025  - Foot surgery (date not specified)    SOCIAL HISTORY  She has stopped drinking alcohol altogether for the past 5 to 6 months.          Objective:     Exam:  /62   Pulse 72   Temp 36.4 °C (97.5 °F) (Temporal)   Resp 18   Ht 1.702 m (5' 7\")   Wt 65 kg (143 lb 6.4 oz)   SpO2 98%   BMI 22.46 kg/m²  Body mass index is 22.46 kg/m².    Physical Exam  General Appearance: Normal.  Vital signs: Within normal " limits.  HEENT: Within normal limits.  Respiratory: Clear to auscultation, no wheezing, rales or rhonchi.  Cardiovascular: Regular rate and rhythm, no murmurs, rubs, or gallops.  Neurological: Cranial nerves are intact. Movement is symmetrical.  Physical Exam          Results  Labs   - Total cholesterol: 170    Imaging   - Brain MRI: Small chronic infarct in the right parietal lobe   - Calcium CT: 04/11/2025, Zero    Diagnostic Testing   - Cologuard: Negative      Assessment & Plan:       1. Trigeminal neuralgia of left side of face  - Referral to Neurology    2. History of cerebral infarction    3. Balance problem    4. Anhedonia      Assessment & Plan  1. Trigeminal neuralgia: Stable.  - Cymbalta 30 mg and gabapentin have controlled the trigeminal neuralgia pain, but significant mood swings and anhedonia are reported.  - Referral to the [facility] at [place] will be initiated.  - Reduce Cymbalta dosage by half for one week before discontinuing it completely.  - Consider carbamazepine if symptoms return, with awareness of potential side effects.  - Monitor symptoms and may restart Cymbalta if necessary in interim.    2. Chronic infarct in the right parietal lobe: Chronic.  - Recent brain MRI revealed a small chronic infarct in the right parietal lobe.  - No chronic symptoms are reported, and the infarct's occurrence is unknown.  - Discuss findings with cardiologist during upcoming appointment.  - Review standard care for stroke prevention, including statin and aspirin therapy, with cardiologist. Patient declines starting medication therapy at this time    3. Balance issues.  - Balance issues possibly related to recent elbow and foot surgeries.  - No gross motor deficits observed; running resumed despite recent injuries.  - See physical therapist to work on strengthening and improving balance.    4. Medication management.  - Currently on Cymbalta 30 mg, wishes to discontinue due to mood-related  side effects.  - Taper off by taking half the dose for one week before stopping completely.  - Monitor symptoms and may restart Cymbalta if necessary.  - Diazepam used for anxiety, no history of panic attacks.    5. Health maintenance.  - Cologuard test negative.  - Calcium CT score zero as of 04/11/2025.  - Schedule Pap smear and mammogram.  - Blood pressure well controlled at 110/62.    Follow-up  - Follow up in 6 weeks.         Return in about 6 weeks (around 6/30/2025), or if symptoms worsen or fail to improve, for Medication F/U.      This note was created using voice recognition software (Dragon). The accuracy of the dictation is limited by the abilities of the software. I have reviewed the note prior to signing, however some errors in grammar and context are still possible. If you have any questions related to this note please do not hesitate to contact our office.

## 2025-05-20 NOTE — PROGRESS NOTES
"      Medical decision making:  - Prinzmetal angina well-controlled with her amlodipine.  Has as needed nitroglycerin tablets.  If she decides she would like the spray, we can send a prescription or she can try to get it through [facility].  Instructions given.  - Abnormal MRI, will seek expert consultation.  She would consider taking aspirin twice a week.  Not interested in statin therapy at this time.  - In terms of cardiac, calcium score remains 0.  Cholesterol looks great.  Checking ApoB, Lipoprotein A and hs-CRP.  - Exercising regularly without cardiac symptoms, setbacks related to her elbow fracture.  - Practices vegan diet  -No concerns about low blood pressure, no symptoms.  This is actually a health advantage.  -She is not high risk for any surgery with deep sedation or general anesthesia.  Letter today.  - RTC with me 6 months    Problem list:  1. Prinzmetal angina (HCC)  - APOLIPOPROTEIN B; Future  - CRP HIGH SENSITIVE (CARDIAC); Future  - Lipoprotein (a); Future  - aspirin 81 MG EC tablet; Take 1 Tablet by mouth Every Sunday and Wednesday.    2. Venous (peripheral) insufficiency    3. Palpitations    4. Precordial pain    5. Mild intermittent asthma without complication    6. Other chest pain  - nitroglycerin (NITROSTAT) 0.4 MG SL Tab; Place 1 Tablet under the tongue as needed for Chest Pain (1 tab under the tongue every 5 minutes for chest pain, up to 3 doses in 15 minutes).  Dispense: 25 Tablet; Refill: 11    7. Abnormal MRI  - Referral to Neurology  - aspirin 81 MG EC tablet; Take 1 Tablet by mouth Every Sunday and Wednesday.    8. Trigeminal neuralgia  - Referral to Neurology     Chief Complaint:   Chief Complaint   Patient presents with    Hypertension     Primary hypertension    Other     Re-Establish Care     History of Present Illness:  \"Martha\" Alejandra Berkowitz is a 65 y.o. female who is self referred to reestablish with [facility] for prinzmetal angina, palpitations, mild CVI, asthma, " trigeminal neuralgia, abnormal MRI.    I previously cared for her in 2019, last appt in 2022.    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  Had a heart catheterization at that time that demonstrated tiny vessel occlusion.  Previously took calcium channel blockers.  Her EKG does show small inferior Q waves.     Her prinzmetal has been well controlled, better after menopause.  Her BP was 150/100 with her dentist. Cut out caffeine, wine, avoiding salt, stress management.  Has as needed nitroglycerin, sometimes forgets to carry it.      Has heart racing, lastly about 5-10 sec, occurring a few times a week, increased over the past 1-2 months.  This is not currently an issue.    Trigeminal neuralgia now, gabapentin caused lightheadedness resulting in fall and elbow surgery.  MRI of the brain said deep matter old stroke.  Going for an opinion with a neurologist.     She is a runner and exercises vigorously, is a runner and able to run more than 10 miles.    No more wine.     She is Vegan.     No prior hyperlipidemia.  LDL cholesterol is 65.  Calcium score was 0 in 2017 and 2025.  Prior normal echo and lower extreme Doppler study.  We will be checking Lipoprotein A, ApoB and hs-CRP.    Mild CVI, no issues.     Asthma, no issues.      No family history of premature coronary artery disease.  No prior smoking history.  No history of diabetes.  No history of autoimmune disease such as lupus or rheumatoid arthritis.  No history of chest radiation or cardiotoxic chemotherapy.  No chronic kidney disease.  No ETOH overuse.  No caffeine overuse.  No recreation substance use.    Not limited by chest pain, pressure or tightness with activity.  No significant dyspnea on exertion, orthopnea or lower extremity swelling.  No significant palpitations, lightheadedness, or presyncope/syncope.  No symptoms of leg claudication.  No stroke/TIA like  symptoms.    Lives in [place].  Point of contact is her sister in the [place].  Sagar Meng, her SO, passed in 2023 w/ amyloidosis.   She is a .    Wt Readings from Last 5 Encounters:   05/21/25 65.4 kg (144 lb 1.6 oz)   05/19/25 65 kg (143 lb 6.4 oz)   04/24/25 66.9 kg (147 lb 7.8 oz)   04/22/25 65.8 kg (145 lb)   04/18/25 65.8 kg (145 lb)       DATA REVIEWED by me:  ECG (my personal interpretation)   2/28/2019 sinus arrhythmia, rate 64, ventricular escape, small inferior Q waves  12/8/2016 Sinus bradycardia, rate 57, normal EKG    Heart Monitor  NA    Device check  NA    Echocardiogram  1/19/2017  EF 65%, normal echo, no RVSP    Calcium score   4-11-25  Coronary calcification:  LMA - 0.0  LCX - 0.0  LAD - 0.0  RCA - 0.0  Total Calcium Score: 0.0  1/26/2017:   Coronary calcification:  LMA - 0.0  LCX - 0.0  LAD - 0.0  RCA - 0.0  PDA - 0.0  Calcium score: 0    Heart Catheterization  ~2005 in [facility]  Reportedly with very tiny occluded vessel consistent with coronary spasm.    Stress test  N/A    Lower extremity venous Doppler   5/30/2013  Mild venous reflux, no DVT    MRI brain  5-13-25  1.  There is a small vein noted abutting the lateral and inferior aspect of the left trigeminal exit zone.  2.  A small chronic infarct in the right parietal lobe.  3.  Mild chronic microvascular ischemic disease.  4-24-25 MRI/A brain  Questionable vascular structure adjacent to the left trigeminal nerve root entry zone. Recommend additional evaluation with 3-D steady state ([facility] protocol FIESTA/CISS images) data set to assess for neurovascular impingement.  No acute process. Remote infarcts in the right parietal subcortical region and left posterior limb internal capsule.  Normal intracranial MRA.     Most recent labs:     2/18/2019 TSH 2.9, B12 605, total cholesterol 132, triglycerides 68, HDL 53, LDL 65, glucose 70, creatinine 0.86, sodium 143, potassium 4.1, LFTs normal, hemoglobin 15.4, platelets 249  "    Lab Results   Component Value Date/Time    WBC 4.4 (L) 2025 07:32 AM    HEMOGLOBIN 14.2 2025 07:32 AM    HEMATOCRIT 43.7 2025 07:32 AM    .9 (H) 2025 07:32 AM    INR 0.97 2025 07:32 AM      Lab Results   Component Value Date/Time    SODIUM 143 2025 07:32 AM    POTASSIUM 4.3 2025 07:32 AM    CHLORIDE 107 2025 07:32 AM    CO2 25 2025 07:32 AM    GLUCOSE 88 2025 07:32 AM    BUN 11 2025 07:32 AM    CREATININE 0.83 2025 07:32 AM    CREATININE 0.8 2007 01:48 PM      Lab Results   Component Value Date/Time    ASTSGOT 25 2025 07:32 AM    ALTSGPT 17 2025 07:32 AM    ALBUMIN 4.3 2025 07:32 AM      Lab Results   Component Value Date/Time    CHOLSTRLTOT 162 2025 07:32 AM    LDL 92 2025 07:32 AM    HDL 58 2025 07:32 AM    TRIGLYCERIDE 61 2025 07:32 AM     No results for input(s): \"NTPROBNP\", \"TROPONINT\" in the last 72 hours.      Past Medical History[1]  Past Surgical History[2]  Family History   Problem Relation Age of Onset    Hypertension Mother 55        Smoker, overweight, no exercise    No Known Problems Father     Heart Attack Neg Hx     Heart Disease Neg Hx     Heart Failure Neg Hx      Social History     Socioeconomic History    Marital status: Single     Spouse name: Not on file    Number of children: Not on file    Years of education: Not on file    Highest education level: Not on file   Occupational History    Not on file   Tobacco Use    Smoking status: Former     Current packs/day: 0.00     Types: Cigarettes     Quit date: 1993     Years since quittin.4    Smokeless tobacco: Never   Vaping Use    Vaping status: Never Used   Substance and Sexual Activity    Alcohol use: Not Currently     Comment: 5 per week    Drug use: No    Sexual activity: Not on file   Other Topics Concern    Not on file   Social History Narrative    Not on file     Social Drivers of Health     Financial " "Resource Strain: Not on file   Food Insecurity: Not on file   Transportation Needs: Not on file   Physical Activity: Not on file   Stress: Not on file   Social Connections: Not on file   Intimate Partner Violence: Not on file   Housing Stability: Not on file     Allergies[3]    Current Medications[4]    ROS  All others systems reviewed and negative.    /60 (BP Location: Left arm, Patient Position: Sitting, BP Cuff Size: Adult)   Pulse 66   Resp 14   Ht 1.702 m (5' 7\")   Wt 65.4 kg (144 lb 1.6 oz)   SpO2 98%  Body mass index is 22.57 kg/m².    General: No acute distress. Well nourished.  HEENT: EOM grossly intact, no scleral icterus, no pharyngeal erythema.   Neck:  No JVD, no bruits, trachea midline  CVS: RRR. Normal S1, S2. No M/R/G. No LE edema.  2+ radial pulses, 2+ PT pulses  Resp: CTAB. No wheezing or crackles/rhonchi. Normal respiratory effort.  Abdomen: Soft, NT, no tena hepatomegaly.  MSK/Ext: No clubbing or cyanosis.  Skin: Warm and dry, no rashes.  Neurological: CN III-XII grossly intact. No focal deficits.   Psych: A&O x 3, appropriate affect, good judgement      Return in about 6 months (around 11/21/2025).    Written instructions given today:      *I recommend not checking her blood pressure more than once a week.  There is no such thing as low blood pressure unless you are symptomatic including lightheadedness, feel like you are in a pass out or fall down.  My grandfather lived to be 97 and his systolic blood pressure was in the 90s his whole life.  It is actually an advantage.    - Fasting blood work at your convenience which includes lipoprotein a, ApoB and hs-CRP to know more about your vascular risk.  Is taking aspirin not something that sounds like twice a week    - We hypothesize that a deep brain vascular event involves a plaque rupture and a blood clot forming.  In general we keep people on a baby aspirin for secondary prevention.  It would be reasonable to take baby aspirin 81 mg " twice a week.  If it upsets your stomach, get the enteric-coated aspirin.  The aspirin sticks around so you do not have to take it every day.  That would be a reasonable    - If you decide you want nitro spray, I can send it anytime and you can price check it here or consider picking up a paper prescription prescription to get filled through [facility].    -I am not endorsing these companies but I have had patients tell me they have had good experience with these companies:    [facility]  Pharmacy in [facility]  Address: [address], [place] [address], [place]  Province: [place]  Phone: [phone]    [facility], [place].  Very positive experience.  [phone]    [email or web address]  phone [phone]    [place] [address]  Office:[phone]  Fax:[phone]  (Xarelto came from [employer], in 3 weeks. Comes in 28-day foil blister pack, 3 packs total, 84 doses)         It is my pleasure to participate in the care of Ms. Berkowitz.  Please do not hesitate to contact me with questions or concerns.    Sakina Lopez MD, [facility]  Cardiologist, [facility]    Please note that this dictation was created using voice recognition software. I have made every reasonable attempt to correct obvious errors, but it is possible there are errors of grammar and possibly content that I did not discover before finalizing the note.         [1]   Past Medical History:  Diagnosis Date    ASTHMA     Dental disorder     braces    H/O Prinzmetal angina 2011    last time 2011   [2]   Past Surgical History:  Procedure Laterality Date    PB OPEN TX ULNAR FRACTURE PBOX END Left 4/24/2025    Procedure: LEFT FOREARM OPEN RECUCTION INTERNAL FIXATION;  Surgeon: Phill Norton M.D.;  Location: [facility];  Service: Orthopedics    PHLEBECTOMY   6/26/2013    Performed by Darrius Blair M.D. at SURGERY [facility] ORS    OTHER ORTHOPEDIC SURGERY  2004    neuroma bilat feet.    BREAST BIOPSY      hx of benign right breast biopsy    OTHER      varicose vein   [3] No Known Allergies  [4]   Current Outpatient Medications   Medication Sig Dispense Refill    nitroglycerin (NITROSTAT) 0.4 MG SL Tab Place 1 Tablet under the tongue as needed for Chest Pain (1 tab under the tongue every 5 minutes for chest pain, up to 3 doses in 15 minutes). 25 Tablet 11    aspirin 81 MG EC tablet Take 1 Tablet by mouth Every Sunday and Wednesday.      DULoxetine (CYMBALTA) 30 MG Cap DR Particles Take 1 Capsule by mouth every day. 90 Capsule 1    diazePAM (VALIUM) 5 MG Tab Take 1 Tablet by mouth at bedtime as needed for Anxiety or Sleep for up to 90 days. Indications: Feeling Anxious 30 Tablet 0    amLODIPine (NORVASC) 5 MG Tab TAKE ONE TABLET BY MOUTH ONCE DAILY 90 Tablet 3    beclomethasone HFA (QVAR REDIHALER) 40 MCG/ACT inhaler Inhale 1 Puff every day. 1 Each 11    meloxicam (MOBIC) 15 MG tablet  (Patient taking differently: AS NEEDED)       No current facility-administered medications for this visit.

## 2025-05-21 ENCOUNTER — OFFICE VISIT (OUTPATIENT)
Dept: CARDIOLOGY | Facility: MEDICAL CENTER | Age: 65
End: 2025-05-21
Attending: INTERNAL MEDICINE
Payer: COMMERCIAL

## 2025-05-21 VITALS
OXYGEN SATURATION: 98 % | HEART RATE: 66 BPM | HEIGHT: 67 IN | DIASTOLIC BLOOD PRESSURE: 60 MMHG | BODY MASS INDEX: 22.62 KG/M2 | WEIGHT: 144.1 LBS | RESPIRATION RATE: 14 BRPM | SYSTOLIC BLOOD PRESSURE: 116 MMHG

## 2025-05-21 DIAGNOSIS — R07.2 PRECORDIAL PAIN: ICD-10-CM

## 2025-05-21 DIAGNOSIS — I87.2 VENOUS (PERIPHERAL) INSUFFICIENCY: ICD-10-CM

## 2025-05-21 DIAGNOSIS — R07.89 OTHER CHEST PAIN: ICD-10-CM

## 2025-05-21 DIAGNOSIS — G50.0 TRIGEMINAL NEURALGIA: ICD-10-CM

## 2025-05-21 DIAGNOSIS — I20.1 PRINZMETAL ANGINA (HCC): Primary | ICD-10-CM

## 2025-05-21 DIAGNOSIS — J45.20 MILD INTERMITTENT ASTHMA WITHOUT COMPLICATION: ICD-10-CM

## 2025-05-21 DIAGNOSIS — R93.89 ABNORMAL MRI: ICD-10-CM

## 2025-05-21 DIAGNOSIS — R00.2 PALPITATIONS: ICD-10-CM

## 2025-05-21 PROCEDURE — 3074F SYST BP LT 130 MM HG: CPT | Performed by: INTERNAL MEDICINE

## 2025-05-21 PROCEDURE — 3078F DIAST BP <80 MM HG: CPT | Performed by: INTERNAL MEDICINE

## 2025-05-21 PROCEDURE — 99204 OFFICE O/P NEW MOD 45 MIN: CPT | Performed by: INTERNAL MEDICINE

## 2025-05-21 PROCEDURE — 99214 OFFICE O/P EST MOD 30 MIN: CPT | Performed by: INTERNAL MEDICINE

## 2025-05-21 RX ORDER — NITROGLYCERIN 0.4 MG/1
0.4 TABLET SUBLINGUAL PRN
Qty: 25 TABLET | Refills: 11 | Status: SHIPPED | OUTPATIENT
Start: 2025-05-21

## 2025-05-21 RX ORDER — ASPIRIN 81 MG/1
81 TABLET ORAL
COMMUNITY
Start: 2025-05-21

## 2025-05-21 ASSESSMENT — FIBROSIS 4 INDEX: FIB4 SCORE: 1.41

## 2025-05-21 NOTE — LETTER
Alejandra Berkowitz  1960 5/21/2025  11:43 AM    Dear Provider,    Patient is not high risk for: Any surgery with deep sedation/general anesthesia.    Recommendations:  No testing prior.  Okay to hold aspirin prior.    Please contact my office with questions.    Sincerely,    Sakina Lopez MD, [facility]  [facility]ologist  [phone]  (electronically signed to expedite care)

## 2025-05-21 NOTE — PATIENT INSTRUCTIONS
*I recommend not checking her blood pressure more than once a week.  There is no such thing as low blood pressure unless you are symptomatic including lightheadedness, feel like you are in a pass out or fall down.  My grandfather lived to be 97 and his systolic blood pressure was in the 90s his whole life.  It is actually an advantage.    - Fasting blood work at your convenience which includes lipoprotein a, ApoB and hs-CRP to know more about your vascular risk.  Is taking aspirin not something that sounds like twice a week    - We hypothesize that a deep brain vascular event involves a plaque rupture and a blood clot forming.  In general we keep people on a baby aspirin for secondary prevention.  It would be reasonable to take baby aspirin 81 mg twice a week.  If it upsets your stomach, get the enteric-coated aspirin.  The aspirin sticks around so you do not have to take it every day.  That would be a reasonable    - If you decide you want nitro spray, I can send it anytime and you can price check it here or consider picking up a paper prescription prescription to get filled through [facility].    -I am not endorsing these companies but I have had patients tell me they have had good experience with these companies:    [facility]  Pharmacy in [facility]  Address: [address] [place] [address] [address], [place] [address], [place]  Province: [place]  Phone: [phone]    [employer], [place].  Very positive experience.  [phone]    [email or web address]  phone [phone]    [place] [address]  Office:[phone]  Fax:[phone]  (Xarelto came from [employer] [facility] [employer], in 3 weeks. Comes in 28-day foil blister pack, 3 packs total, 84 doses)

## 2025-05-23 NOTE — Clinical Note
REFERRAL APPROVAL NOTICE         Sent on May 23, 2025                   Martha Berkowitz  [address] [place] [address]                   Dear Ms. Berkowitz,    After a careful review of the medical information and benefit coverage, [facility] has processed your referral. See below for additional details.    If applicable, you must be actively enrolled with your insurance for coverage of the authorized service. If you have any questions regarding your coverage, please contact your insurance directly.    REFERRAL INFORMATION   Referral #:  [id number]  Referred-To Provider    Referred-By Provider:  Neurology    Luis Fernando Mcqueen D.O.   [facility]      [address]  [address]  [place] [address]  [phone] [address]  [place] [address]  [phone]    Referral Start Date:  05/19/2025  Referral End Date:   05/19/2026             SCHEDULING  If you do not already have an appointment, please call [phone] to make an appointment.     MORE INFORMATION  If you do not already have a [employer] account, sign up at: [email or web address]  You can access your medical information, make appointments, see lab results, billing information, and more.  If you have questions regarding this referral, please contact  the [facility] Referrals department at:             [phone]. Monday - Friday 8:00AM - 5:00PM.     Sincerely,    [facility]

## 2025-05-28 ENCOUNTER — APPOINTMENT (OUTPATIENT)
Dept: URBAN - METROPOLITAN AREA CLINIC 4 | Facility: CLINIC | Age: 65
Setting detail: DERMATOLOGY
End: 2025-05-28

## 2025-05-28 DIAGNOSIS — L81.4 OTHER MELANIN HYPERPIGMENTATION: ICD-10-CM

## 2025-05-28 DIAGNOSIS — L82.1 OTHER SEBORRHEIC KERATOSIS: ICD-10-CM

## 2025-05-28 DIAGNOSIS — Z71.89 OTHER SPECIFIED COUNSELING: ICD-10-CM

## 2025-05-28 DIAGNOSIS — D22 MELANOCYTIC NEVI: ICD-10-CM

## 2025-05-28 DIAGNOSIS — D18.0 HEMANGIOMA: ICD-10-CM

## 2025-05-28 PROBLEM — D22.5 MELANOCYTIC NEVI OF TRUNK: Status: ACTIVE | Noted: 2025-05-28

## 2025-05-28 PROBLEM — D18.01 HEMANGIOMA OF SKIN AND SUBCUTANEOUS TISSUE: Status: ACTIVE | Noted: 2025-05-28

## 2025-05-28 PROCEDURE — 99213 OFFICE O/P EST LOW 20 MIN: CPT

## 2025-05-28 PROCEDURE — ? SUNSCREEN RECOMMENDATIONS

## 2025-05-28 PROCEDURE — ? COUNSELING

## 2025-05-28 ASSESSMENT — LOCATION ZONE DERM
LOCATION ZONE: ARM
LOCATION ZONE: TRUNK

## 2025-05-28 ASSESSMENT — LOCATION SIMPLE DESCRIPTION DERM
LOCATION SIMPLE: LOWER BACK
LOCATION SIMPLE: RIGHT SHOULDER
LOCATION SIMPLE: LEFT SHOULDER
LOCATION SIMPLE: UPPER BACK
LOCATION SIMPLE: ABDOMEN

## 2025-05-28 ASSESSMENT — LOCATION DETAILED DESCRIPTION DERM
LOCATION DETAILED: INFERIOR THORACIC SPINE
LOCATION DETAILED: SUPERIOR LUMBAR SPINE
LOCATION DETAILED: LEFT POSTERIOR SHOULDER
LOCATION DETAILED: EPIGASTRIC SKIN
LOCATION DETAILED: RIGHT POSTERIOR SHOULDER

## 2025-06-10 ENCOUNTER — HOSPITAL ENCOUNTER (OUTPATIENT)
Dept: RADIOLOGY | Facility: MEDICAL CENTER | Age: 65
End: 2025-06-10
Attending: FAMILY MEDICINE
Payer: COMMERCIAL

## 2025-06-10 DIAGNOSIS — Z12.31 ENCOUNTER FOR SCREENING MAMMOGRAM FOR MALIGNANT NEOPLASM OF BREAST: ICD-10-CM

## 2025-06-10 PROCEDURE — 77063 BREAST TOMOSYNTHESIS BI: CPT

## 2025-07-16 ENCOUNTER — OFFICE VISIT (OUTPATIENT)
Dept: URGENT CARE | Facility: CLINIC | Age: 65
End: 2025-07-16
Payer: COMMERCIAL

## 2025-07-16 VITALS
HEART RATE: 89 BPM | SYSTOLIC BLOOD PRESSURE: 128 MMHG | HEIGHT: 66 IN | WEIGHT: 137 LBS | OXYGEN SATURATION: 98 % | TEMPERATURE: 100.1 F | RESPIRATION RATE: 18 BRPM | DIASTOLIC BLOOD PRESSURE: 82 MMHG | BODY MASS INDEX: 22.02 KG/M2

## 2025-07-16 DIAGNOSIS — U07.1 COVID: Primary | ICD-10-CM

## 2025-07-16 PROBLEM — L82.1 SEBORRHEIC KERATOSIS: Status: ACTIVE | Noted: 2024-06-05

## 2025-07-16 PROBLEM — D18.01 HEMANGIOMA OF SKIN AND SUBCUTANEOUS TISSUE: Status: ACTIVE | Noted: 2024-06-05

## 2025-07-16 PROBLEM — Z86.79 HISTORY OF HYPERTENSION: Status: ACTIVE | Noted: 2024-06-05

## 2025-07-16 PROBLEM — J45.909 ASTHMA: Status: ACTIVE | Noted: 2024-06-05

## 2025-07-16 PROBLEM — D22.5 MELANOCYTIC NEVUS OF TRUNK: Status: ACTIVE | Noted: 2024-06-05

## 2025-07-16 PROCEDURE — 3074F SYST BP LT 130 MM HG: CPT

## 2025-07-16 PROCEDURE — 99213 OFFICE O/P EST LOW 20 MIN: CPT

## 2025-07-16 PROCEDURE — 3079F DIAST BP 80-89 MM HG: CPT

## 2025-07-16 RX ORDER — ALBUTEROL SULFATE 90 UG/1
2 INHALANT RESPIRATORY (INHALATION) EVERY 6 HOURS PRN
Qty: 8.5 G | Refills: 0 | Status: SHIPPED | OUTPATIENT
Start: 2025-07-16

## 2025-07-16 RX ORDER — DEXTROMETHORPHAN HYDROBROMIDE AND PROMETHAZINE HYDROCHLORIDE 15; 6.25 MG/5ML; MG/5ML
5 SYRUP ORAL EVERY 6 HOURS PRN
Qty: 118 ML | Refills: 0 | Status: SHIPPED | OUTPATIENT
Start: 2025-07-16 | End: 2025-07-23

## 2025-07-16 ASSESSMENT — FIBROSIS 4 INDEX: FIB4 SCORE: 1.41

## 2025-07-16 NOTE — PROGRESS NOTES
"Subjective:   Alejandra Berkowitz is a 65 y.o. female who presents for Sore Throat (Covid positive on Monday, requesting anti viral, x 3 days )      HPI:    Patient presents urgent care with concerns of testing positive for COVID on Monday, 2 days ago.  Reports fever, chills, body aches, rhinorrhea, nasal congestion, cough.  Denies wheezing, shortness of breath.  Denies chest pain, dizziness, palpitations, leg swelling.  She has reduced appetite but is tolerating fluids.  Denies known sick contacts, however she did travel to [place].      ROS As above in HPI    Medications:    Medications Ordered Prior to Encounter[1]     Allergies:   Patient has no known allergies.    Problem List:   Problem List[2]     Surgical History:  Past Surgical History:   Procedure Laterality Date    PB OPEN TX ULNAR FRACTURE PBOX END Left 4/24/2025    Procedure: LEFT FOREARM OPEN RECUCTION INTERNAL FIXATION;  Surgeon: Phill Norton M.D.;  Location: [facility];  Service: Orthopedics    PHLEBECTOMY  6/26/2013    Performed by Darrius Blair M.D. at SURGERY [facility]    OTHER ORTHOPEDIC SURGERY  2004    neuroma bilat feet.    BREAST BIOPSY      hx of benign right breast biopsy    OTHER      varicose vein       Past Social Hx:   Social History[3]       Problem list, medications, and allergies reviewed by myself today in Epic.     Objective:     /82   Pulse 89   Temp 37.8 °C (100.1 °F)   Resp 18   Ht 1.676 m (5' 6\")   Wt 62.1 kg (137 lb)   SpO2 98%   BMI 22.11 kg/m²     Physical Exam  Vitals and nursing note reviewed.   Constitutional:       General: She is not in acute distress.     Appearance: Normal appearance. She is not ill-appearing.   HENT:      Head: Normocephalic.      Right Ear: Tympanic membrane and ear canal normal.      Left Ear: Tympanic membrane and ear canal normal.      Nose: Congestion and rhinorrhea present. Rhinorrhea is clear.      Right Sinus: No maxillary sinus tenderness or " frontal sinus tenderness.      Left Sinus: No maxillary sinus tenderness or frontal sinus tenderness.      Mouth/Throat:      Mouth: Mucous membranes are moist.      Pharynx: Oropharynx is clear. Uvula midline. Posterior oropharyngeal erythema present. No pharyngeal swelling or oropharyngeal exudate.      Tonsils: No tonsillar exudate.   Cardiovascular:      Rate and Rhythm: Normal rate and regular rhythm.      Heart sounds: Normal heart sounds. No murmur heard.     No friction rub. No gallop.   Pulmonary:      Effort: Pulmonary effort is normal. No respiratory distress.      Breath sounds: Normal breath sounds. No stridor. No wheezing, rhonchi or rales.   Chest:      Chest wall: No tenderness.   Abdominal:      General: Bowel sounds are normal.      Palpations: Abdomen is soft.   Skin:     General: Skin is warm and dry.      Capillary Refill: Capillary refill takes less than 2 seconds.      Findings: No rash.   Neurological:      Mental Status: She is alert and oriented to person, place, and time.         Assessment/Plan:       Diagnosis and associated orders:   1. COVID  - Nirmatrelvir&Ritonavir 300/100 20 x 150 MG & 10 x 100MG Tablet Therapy Pack; Take 300 mg nirmatrelvir (two 150 mg tablets) with 100 mg ritonavir (one 100 mg tablet) by mouth, with all three tablets taken together twice daily for 5 days.  Dispense: 30 Each; Refill: 0  - promethazine-dextromethorphan (PROMETHAZINE-DM) 6.25-15 MG/5ML syrup; Take 5 mL by mouth every 6 hours as needed for Cough for up to 7 days. (caution: may cause sedation)  Dispense: 118 mL; Refill: 0  - albuterol 108 (90 Base) MCG/ACT Aero Soln inhalation aerosol; Inhale 2 Puffs every 6 hours as needed for Shortness of Breath.  Dispense: 8.5 g; Refill: 0        Comments/MDM:     Patient tested positive for COVID at home, will presume she is positive.  Antiviral medication side effects, adverse effects, rebound illness reviewed with patient. She consented to use of antiviral  medications.  Patient is aware that Paxlovid potentially interacts with amlodipine, and will have her cut her dose in half while she is taking Paxlovid.  She can resume her usual dose approximately 72 hours after she completes Paxlovid.  Vital signs are stable, patient is nontoxic. No signs of respiratory distress.  Supportive measures encouraged: Rest, increased oral hydration, NSAIDs/tylenol as needed per package instructions, decongestant, warm humidification, otc antihistamines, Flonase, cough suppressant as needed   Albuterol ordered for symptomatic relief and in case she develops shortness of breath.  Strict return to ER precautions reviewed  Follow-up with primary care advised       Return to clinic or go to ED if symptoms worsen or persist. Indications for ED discussed at length. Patient/Parent/Guardian voices understanding. Follow-up with your primary care provider in 3-5 days. Red flag symptoms discussed. All side effects of medication discussed including allergic response, GI upset, tendon injury, rash, sedation etc.    Please note that this dictation was created using voice recognition software. I have made a reasonable attempt to correct obvious errors, but I expect that there are errors of grammar and possibly content that I did not discover before finalizing the note.    This note was electronically signed by ANNAMARIE Harden         [1]   Current Outpatient Medications on File Prior to Visit   Medication Sig Dispense Refill    nitroglycerin (NITROSTAT) 0.4 MG SL Tab Place 1 Tablet under the tongue as needed for Chest Pain (1 tab under the tongue every 5 minutes for chest pain, up to 3 doses in 15 minutes). 25 Tablet 11    aspirin 81 MG EC tablet Take 1 Tablet by mouth Every Sunday and Wednesday.      amLODIPine (NORVASC) 5 MG Tab TAKE ONE TABLET BY MOUTH ONCE DAILY 90 Tablet 3    beclomethasone HFA (QVAR REDIHALER) 40 MCG/ACT inhaler Inhale 1 Puff every day. 1 Each 11    meloxicam (MOBIC)  15 MG tablet  (Patient taking differently: AS NEEDED)      DULoxetine (CYMBALTA) 30 MG Cap DR Particles Take 1 Capsule by mouth every day. 90 Capsule 1     No current facility-administered medications on file prior to visit.   [2]   Patient Active Problem List  Diagnosis    Venous (peripheral) insufficiency    Precordial pain    Vasospastic angina (HCC)    Anxiety    Bronchospasm    Encounter for general adult medical examination without abnormal findings    Primary hypertension    Greater trochanteric bursitis    Insomnia    Knee pain, left    Other muscle spasm    Piriformis syndrome, left    Mild intermittent asthma without complication    Generalized anxiety disorder    Left elbow pain    Metatarsalgia of left foot    Hammer toe of left foot    Closed olecranon fracture, left, initial encounter   [3]   Social History  Tobacco Use    Smoking status: Former     Current packs/day: 0.00     Types: Cigarettes     Quit date: 1993     Years since quittin.5    Smokeless tobacco: Never   Vaping Use    Vaping status: Never Used   Substance Use Topics    Alcohol use: Not Currently     Comment: 5 per week    Drug use: No